# Patient Record
Sex: MALE | Race: BLACK OR AFRICAN AMERICAN | Employment: OTHER | URBAN - NONMETROPOLITAN AREA
[De-identification: names, ages, dates, MRNs, and addresses within clinical notes are randomized per-mention and may not be internally consistent; named-entity substitution may affect disease eponyms.]

---

## 2020-08-13 VITALS — HEIGHT: 69 IN | BODY MASS INDEX: 35.96 KG/M2

## 2020-08-13 DIAGNOSIS — E78.5 HYPERLIPIDEMIA, UNSPECIFIED HYPERLIPIDEMIA TYPE: ICD-10-CM

## 2020-08-13 DIAGNOSIS — J43.9 PULMONARY EMPHYSEMA, UNSPECIFIED EMPHYSEMA TYPE (HCC): ICD-10-CM

## 2020-08-13 PROBLEM — E11.9 TYPE 2 DIABETES MELLITUS WITHOUT COMPLICATIONS (HCC): Status: ACTIVE | Noted: 2019-09-19

## 2020-08-13 PROBLEM — I10 ESSENTIAL HYPERTENSION: Status: ACTIVE | Noted: 2019-09-19

## 2020-08-13 PROBLEM — D64.9 ANEMIA, UNSPECIFIED: Status: ACTIVE | Noted: 2020-03-31

## 2020-08-13 RX ORDER — GLIPIZIDE 2.5 MG/1
2.5 TABLET, EXTENDED RELEASE ORAL DAILY
COMMUNITY
End: 2021-02-15 | Stop reason: SDUPTHER

## 2020-08-13 RX ORDER — ALBUTEROL SULFATE 90 UG/1
2 AEROSOL, METERED RESPIRATORY (INHALATION)
COMMUNITY
End: 2021-02-15 | Stop reason: SDUPTHER

## 2020-08-13 RX ORDER — ATORVASTATIN CALCIUM 10 MG/1
10 TABLET, FILM COATED ORAL DAILY
COMMUNITY
End: 2021-02-15 | Stop reason: SDUPTHER

## 2020-08-13 RX ORDER — COLCHICINE 0.6 MG/1
0.6 TABLET ORAL DAILY
COMMUNITY
End: 2021-04-20 | Stop reason: SDUPTHER

## 2020-08-13 RX ORDER — HYDROCHLOROTHIAZIDE 25 MG/1
25 TABLET ORAL DAILY
COMMUNITY
End: 2021-02-15 | Stop reason: SDUPTHER

## 2020-08-13 RX ORDER — METFORMIN HYDROCHLORIDE 500 MG/1
TABLET, FILM COATED, EXTENDED RELEASE ORAL
COMMUNITY
End: 2021-02-15 | Stop reason: SDUPTHER

## 2020-08-13 RX ORDER — MONTELUKAST SODIUM 10 MG/1
10 TABLET ORAL DAILY
COMMUNITY
End: 2021-02-15 | Stop reason: SDUPTHER

## 2021-02-15 ENCOUNTER — VIRTUAL VISIT (OUTPATIENT)
Dept: FAMILY MEDICINE CLINIC | Age: 61
End: 2021-02-15
Payer: MEDICAID

## 2021-02-15 VITALS — DIASTOLIC BLOOD PRESSURE: 89 MMHG | SYSTOLIC BLOOD PRESSURE: 148 MMHG

## 2021-02-15 DIAGNOSIS — I10 ESSENTIAL HYPERTENSION: ICD-10-CM

## 2021-02-15 DIAGNOSIS — D50.8 IRON DEFICIENCY ANEMIA SECONDARY TO INADEQUATE DIETARY IRON INTAKE: ICD-10-CM

## 2021-02-15 DIAGNOSIS — I10 ESSENTIAL HYPERTENSION: Primary | ICD-10-CM

## 2021-02-15 DIAGNOSIS — E11.9 TYPE 2 DIABETES MELLITUS WITHOUT COMPLICATION, WITHOUT LONG-TERM CURRENT USE OF INSULIN (HCC): ICD-10-CM

## 2021-02-15 DIAGNOSIS — E78.41 ELEVATED LIPOPROTEIN(A): ICD-10-CM

## 2021-02-15 DIAGNOSIS — Z12.5 PROSTATE CANCER SCREENING: ICD-10-CM

## 2021-02-15 DIAGNOSIS — Z12.11 SCREENING FOR COLON CANCER: ICD-10-CM

## 2021-02-15 DIAGNOSIS — J43.9 PULMONARY EMPHYSEMA, UNSPECIFIED EMPHYSEMA TYPE (HCC): ICD-10-CM

## 2021-02-15 PROCEDURE — 99214 OFFICE O/P EST MOD 30 MIN: CPT | Performed by: NURSE PRACTITIONER

## 2021-02-15 RX ORDER — ALBUTEROL SULFATE 90 UG/1
2 AEROSOL, METERED RESPIRATORY (INHALATION)
Qty: 1 INHALER | Refills: 1 | Status: SHIPPED | OUTPATIENT
Start: 2021-02-15

## 2021-02-15 RX ORDER — GLIPIZIDE 2.5 MG/1
2.5 TABLET, EXTENDED RELEASE ORAL DAILY
Qty: 30 TAB | Refills: 1 | Status: SHIPPED | OUTPATIENT
Start: 2021-02-15 | End: 2021-04-20 | Stop reason: ALTCHOICE

## 2021-02-15 RX ORDER — METFORMIN HYDROCHLORIDE 500 MG/1
1 TABLET, FILM COATED, EXTENDED RELEASE ORAL DAILY
Qty: 30 TAB | Refills: 1 | Status: SHIPPED | OUTPATIENT
Start: 2021-02-15 | End: 2021-04-20 | Stop reason: ALTCHOICE

## 2021-02-15 RX ORDER — HYDROCHLOROTHIAZIDE 25 MG/1
25 TABLET ORAL DAILY
Qty: 30 TAB | Refills: 1 | Status: SHIPPED | OUTPATIENT
Start: 2021-02-15 | End: 2021-05-20

## 2021-02-15 RX ORDER — MONTELUKAST SODIUM 10 MG/1
10 TABLET ORAL DAILY
Qty: 30 TAB | Refills: 1 | Status: SHIPPED | OUTPATIENT
Start: 2021-02-15

## 2021-02-15 RX ORDER — ATORVASTATIN CALCIUM 10 MG/1
10 TABLET, FILM COATED ORAL DAILY
Qty: 30 TAB | Refills: 1 | Status: SHIPPED | OUTPATIENT
Start: 2021-02-15 | End: 2021-05-20

## 2021-02-15 NOTE — PROGRESS NOTES
Oleksandr Fitzpatrick is a 64 y.o. male who was seen by synchronous (real-time) audio-video technology on 2/15/2021 for Follow-up, Diabetes, Cholesterol Problem, and Hypertension        Assessment & Plan:   Diagnoses and all orders for this visit:    1. Essential hypertension  - Uncontrolled per his home readings   - Start medications as prescribed   - Checking labs   -Follow-up in 1 month, sooner if needed. Goal BP is 130/80 or lower   -     hydroCHLOROthiazide (HYDRODIURIL) 25 mg tablet; Take 1 Tab by mouth daily. Indications: high blood pressure  -     CBC WITH AUTOMATED DIFF; Future  -     METABOLIC PANEL, COMPREHENSIVE; Future  -     TSH 3RD GENERATION; Future    2. Type 2 diabetes mellitus without complication, without long-term current use of insulin (HCC)  -Unknown status   -Checking labs to include A1C   - I will contact him with results and any changes in medications   - Follow-up in 3 months   -     glipiZIDE SR (GLUCOTROL XL) 2.5 mg CR tablet; Take 1 Tab by mouth daily. Indications: type 2 diabetes mellitus  -     metFORMIN (GLUMETZA ER) 500 mg TG24 24 hour tablet; Take 500 mg by mouth daily. Indications: type 2 diabetes mellitus  -     HEMOGLOBIN A1C WITH EAG; Future  -     MICROALBUMIN, UR, RAND W/ MICROALB/CREAT RATIO; Future    3. Pulmonary emphysema, unspecified emphysema type (HCC)  -Controlled   -     albuterol (ProAir HFA) 90 mcg/actuation inhaler; Take 2 Puffs by inhalation every six (6) hours as needed for Wheezing. Indications: bronchospasm prevention  -     montelukast (SINGULAIR) 10 mg tablet; Take 1 Tab by mouth daily. Indications: controller medication for asthma    4. Elevated lipoprotein(a)  -Checking status   -Without statin for 4 months at least   -     atorvastatin (LIPITOR) 10 mg tablet; Take 1 Tab by mouth daily. Indications: high cholesterol and high triglycerides  -     LIPID PANEL; Future    5.  Iron deficiency anemia secondary to inadequate dietary iron intake  -Checking status  -     IRON PROFILE; Future  -     FERRITIN; Future    6. Screening for colon cancer  -     REFERRAL TO GASTROENTEROLOGY    7. Prostate cancer screening  -     PSA SCREENING (SCREENING)              712  Subjective:   Hypertension  Patient is here for follow-up of hypertension. He indicates that he is feeling well and denies any symptoms referable to his hypertension. He is exercising and is adherent to low salt diet. Blood pressure is well controlled at home. Patient reports being without medications for about 4 months. Hyperlipidemia   Diet and Lifestyle: generally follows a low fat low cholesterol diet, generally follows a low sodium diet  Taking medications as instructed, no medication side effects noted, no TIA's, no chest pain on exertion, no dyspnea on exertion, no swelling of ankles. Diabetes   Myrna Clark is a 64 y.o. male who presents for follow-up of Type 2 diabetes mellitus. Patient has been without medication for 4 months. He is not monitoring his blood sugar. Last seen by eye doctor 6 months. Denies any polyuria, polydipsia, visual disturbances, paresthesias of the feet or dyspnea on exertion      Prior to Admission medications    Medication Sig Start Date End Date Taking? Authorizing Provider   albuterol (ProAir HFA) 90 mcg/actuation inhaler Take 2 Puffs by inhalation every six (6) hours as needed for Wheezing. Provider, Historical   montelukast (SINGULAIR) 10 mg tablet Take 10 mg by mouth daily. Provider, Historical   metFORMIN (GLUMETZA ER) 500 mg TG24 24 hour tablet Take  by mouth. Provider, Historical   hydroCHLOROthiazide (HYDRODIURIL) 25 mg tablet Take 25 mg by mouth daily. Provider, Historical   glipiZIDE SR (GLUCOTROL XL) 2.5 mg CR tablet Take 2.5 mg by mouth daily. Provider, Historical   colchicine 0.6 mg tablet Take 0.6 mg by mouth daily. Provider, Historical   atorvastatin (LIPITOR) 10 mg tablet Take 10 mg by mouth daily.     Provider, Historical         Review of Systems   Constitutional: Negative for chills, fever, malaise/fatigue and weight loss. HENT: Negative for sinus pain and sore throat. Eyes: Negative for blurred vision. Respiratory: Negative for cough, sputum production, shortness of breath and wheezing. Cardiovascular: Negative for chest pain, palpitations and leg swelling. Gastrointestinal: Negative for abdominal pain, constipation, diarrhea and heartburn. Genitourinary: Negative for dysuria and frequency. Musculoskeletal: Negative for falls and myalgias. Skin: Negative for rash. Neurological: Negative for dizziness, tingling, weakness and headaches. Endo/Heme/Allergies: Does not bruise/bleed easily. Psychiatric/Behavioral: Negative for depression. The patient is not nervous/anxious and does not have insomnia. Physical Exam  Vitals signs and nursing note reviewed. Constitutional:       Appearance: Normal appearance. Comments: Physical exam was deferred due to COVID- 19 precautions as visit was completed via telemedicine. Neurological:      Mental Status: He is alert. Dee Dukes, who was evaluated through a patient-initiated, synchronous (real-time) audio-video encounter, and/or his healthcare decision maker, is aware that it is a billable service, with coverage as determined by his insurance carrier. He provided verbal consent to proceed: Yes, and patient identification was verified. It was conducted pursuant to the emergency declaration under the [facility], [address] [place] [address] authority and the [employer] Act. A caregiver was present when appropriate. Ability to conduct physical exam was limited. I was in the office. The patient was at home.       Mp Turner NP

## 2021-02-15 NOTE — PROGRESS NOTES
patient stated he hasnt been on any meds in a while because we did not fill them but when I updated the medication list most of them said filled in 2014    He has to do a tele visit

## 2021-02-16 ENCOUNTER — HOSPITAL ENCOUNTER (OUTPATIENT)
Dept: LAB | Age: 61
Discharge: HOME | End: 2021-02-16

## 2021-02-17 LAB — HBA1C MFR BLD HPLC: 5.8 %

## 2021-03-02 DIAGNOSIS — R97.20 ELEVATED PROSTATE SPECIFIC ANTIGEN (PSA): Primary | ICD-10-CM

## 2021-03-08 ENCOUNTER — VIRTUAL VISIT (OUTPATIENT)
Dept: FAMILY MEDICINE CLINIC | Age: 61
End: 2021-03-08
Payer: MEDICAID

## 2021-03-08 DIAGNOSIS — I10 ESSENTIAL HYPERTENSION: Primary | ICD-10-CM

## 2021-03-08 DIAGNOSIS — R97.20 ELEVATED PSA: ICD-10-CM

## 2021-03-08 PROCEDURE — 99442 PR PHYS/QHP TELEPHONE EVALUATION 11-20 MIN: CPT | Performed by: NURSE PRACTITIONER

## 2021-03-08 RX ORDER — COLCHICINE 0.6 MG/1
0.6 TABLET ORAL DAILY
Status: CANCELLED | OUTPATIENT
Start: 2021-03-08

## 2021-03-08 NOTE — PROGRESS NOTES
Alcides Robin is a 64 y.o. male, evaluated via audio-only technology on 3/8/2021 for Follow-up (3 week follow up)  . Assessment & Plan:   Diagnoses and all orders for this visit:    1. Essential hypertension  -Under better control since starting medication as prescribed. Continue to monitor.   -Follow-up in 4 months, sooner if needed   2. Elevated PSA  -PSA 21.9   -Stat urology referral placed. 12  Subjective:   Hypertension  Patient is here for follow-up of hypertension. He indicates that he is feeling well and denies any symptoms referable to his hypertension. He is  adherent to low salt diet. Blood pressure is well controlled at home. Reading today before medication 138/70, controlled since his last visit since restarting medication, he was without medication for about 4 months. Elevated PSA -Recent PSA is 21, Urology referral sent stat to Dr. Kathryn Bhagat office. Prior to Admission medications    Medication Sig Start Date End Date Taking? Authorizing Provider   albuterol (ProAir HFA) 90 mcg/actuation inhaler Take 2 Puffs by inhalation every six (6) hours as needed for Wheezing. Indications: bronchospasm prevention 2/15/21  Yes Elder Mitchell NP   atorvastatin (LIPITOR) 10 mg tablet Take 1 Tab by mouth daily. Indications: high cholesterol and high triglycerides 2/15/21  Yes Cris Pozo NP   glipiZIDE SR (GLUCOTROL XL) 2.5 mg CR tablet Take 1 Tab by mouth daily. Indications: type 2 diabetes mellitus 2/15/21  Yes Petty Pozo NP   hydroCHLOROthiazide (HYDRODIURIL) 25 mg tablet Take 1 Tab by mouth daily. Indications: high blood pressure 2/15/21  Yes Petty Pozo NP   metFORMIN (GLUMETZA ER) 500 mg TG24 24 hour tablet Take 500 mg by mouth daily. Indications: type 2 diabetes mellitus 2/15/21  Yes Petty Pozo NP   montelukast (SINGULAIR) 10 mg tablet Take 1 Tab by mouth daily.  Indications: controller medication for asthma 2/15/21  Yes Elder Mitchell NP colchicine 0.6 mg tablet Take 0.6 mg by mouth daily. Yes Provider, Historical         Review of Systems   Constitutional: Negative for chills, fever, malaise/fatigue and weight loss. HENT: Negative for sinus pain and sore throat. Eyes: Negative for blurred vision. Respiratory: Negative for cough, sputum production, shortness of breath and wheezing. Cardiovascular: Negative for chest pain, palpitations and leg swelling. Gastrointestinal: Negative for abdominal pain, constipation, diarrhea and heartburn. Genitourinary: Positive for frequency and urgency. Negative for dysuria. Musculoskeletal: Negative for falls and myalgias. Skin: Negative for rash. Neurological: Negative for dizziness, tingling, weakness and headaches. Endo/Heme/Allergies: Does not bruise/bleed easily. Psychiatric/Behavioral: Negative for depression. The patient is not nervous/anxious and does not have insomnia. Physical Exam  Vitals signs and nursing note reviewed. Constitutional:       Comments: Physical exam was deferred due to COVID- 19 precautions as visit was completed via telemedicine. Patient-Reported Vitals 3/8/2021   Patient-Reported Weight [facility], who was evaluated through a patient-initiated, synchronous (real-time) audio only encounter, and/or her healthcare decision maker, is aware that it is a billable service, with coverage as determined by his insurance carrier. He provided verbal consent to proceed: Yes. He has not had a related appointment within my department in the past 7 days or scheduled within the next 24 hours.       Total Time: minutes: 11-20 minutes    Armida Flores NP

## 2021-03-15 VITALS — DIASTOLIC BLOOD PRESSURE: 70 MMHG | SYSTOLIC BLOOD PRESSURE: 138 MMHG

## 2021-04-20 ENCOUNTER — VIRTUAL VISIT (OUTPATIENT)
Dept: FAMILY MEDICINE CLINIC | Age: 61
End: 2021-04-20
Payer: MEDICAID

## 2021-04-20 DIAGNOSIS — M1A.09X0 IDIOPATHIC CHRONIC GOUT OF MULTIPLE SITES WITHOUT TOPHUS: ICD-10-CM

## 2021-04-20 DIAGNOSIS — E11.9 TYPE 2 DIABETES MELLITUS WITHOUT COMPLICATION, WITHOUT LONG-TERM CURRENT USE OF INSULIN (HCC): Primary | ICD-10-CM

## 2021-04-20 PROCEDURE — 99442 PR PHYS/QHP TELEPHONE EVALUATION 11-20 MIN: CPT | Performed by: INTERNAL MEDICINE

## 2021-04-20 RX ORDER — COLCHICINE 0.6 MG/1
0.6 TABLET ORAL DAILY
Qty: 60 TAB | Refills: 3 | Status: SHIPPED | OUTPATIENT
Start: 2021-04-20 | End: 2021-06-03 | Stop reason: SDUPTHER

## 2021-04-20 NOTE — PROGRESS NOTES
Renato Qureshi is a 63 y.o. male, evaluated via audio-only technology on 4/20/2021 for No chief complaint on file. Doing well. Good BP. Sugars are well. Noted a1c. Needs refills on gout meds. Currently is not having any pain. Is following up with his urologist for his elevated PSA. He got good bowels and bladder at this point. Assessment & Plan:   1. Type 2 diabetes mellitus without complication, without long-term current use of insulin ([address]  At this time we will stop his glipizide as well as his Metformin. The patient is currently walking daily and we continue to recommend that he walk and exercise daily. Additionally went over his diet to continue to maintain he limits overall sweets and overall sugars but continue on good fruits and vegetables. We will need to follow this up in 3 months to see what his labs go. 2. Idiopathic chronic gout of multiple sites without tophus  Patient is been stable on his colchicine now and used it rarely. Continue to use it as needed. Continue to watch diet as well as increase fluid intake. 12  Subjective:       Prior to Admission medications    Medication Sig Start Date End Date Taking? Authorizing Provider   albuterol (ProAir HFA) 90 mcg/actuation inhaler Take 2 Puffs by inhalation every six (6) hours as needed for Wheezing. Indications: bronchospasm prevention 2/15/21   Sarah Orourke NP   atorvastatin (LIPITOR) 10 mg tablet Take 1 Tab by mouth daily. Indications: high cholesterol and high triglycerides 2/15/21   Sarah Orourke NP   hydroCHLOROthiazide (HYDRODIURIL) 25 mg tablet Take 1 Tab by mouth daily. Indications: high blood pressure 2/15/21   Peace Pozo NP   montelukast (SINGULAIR) 10 mg tablet Take 1 Tab by mouth daily. Indications: controller medication for asthma 2/15/21   Sarah Orourke NP   glipiZIDE SR (GLUCOTROL XL) 2.5 mg CR tablet Take 1 Tab by mouth daily.  Indications: type 2 diabetes mellitus 2/15/21 4/20/21  Stacia Chase Lis Owens NP   metFORMIN (GLUMETZA ER) 500 mg TG24 24 hour tablet Take 500 mg by mouth daily. Indications: type 2 diabetes mellitus 2/15/21 4/20/21  Tracy Munguia NP   colchicine 0.6 mg tablet Take 0.6 mg by mouth daily. Provider, Historical         ROS    Patient-Reported Vitals 3/8/2021   Patient-Reported Weight [facility], who was evaluated through a patient-initiated, synchronous (real-time) audio only encounter, and/or her healthcare decision maker, is aware that it is a billable service, with coverage as determined by his insurance carrier. He provided verbal consent to proceed: Yes. He has not had a related appointment within my department in the past 7 days or scheduled within the next 24 hours.       Total Time: minutes: 11-20 minutes    Rasheed Carbone MD

## 2021-05-04 DIAGNOSIS — Z12.11 ENCOUNTER FOR SCREENING COLONOSCOPY: Primary | ICD-10-CM

## 2021-05-04 RX ORDER — POLYETHYLENE GLYCOL 3350 17 G/17G
POWDER, FOR SOLUTION ORAL
Qty: 510 G | Refills: 0 | Status: SHIPPED | OUTPATIENT
Start: 2021-05-04 | End: 2021-05-28

## 2021-05-04 NOTE — TELEPHONE ENCOUNTER
Mom, Samantha Bañuelos called in said her son Jesi Benson, who is autistic needs to be scheduled for a colon screening. We discussed the questionaire, he is ok to schedule. She chose May 28th at 11:00. She will help him to do prep and bring him for covid test and colonoscopy. Orders and instructions mailed to them, Miralax prep pended to Dr Kathy Liu to send to [facility]. Orders routed to Rory Best RN, and faxed to [phone] scheduling . Notification sent to Lilliam Elmore NP.  No PA required per [place] B at [facility] at 11:47 am.

## 2021-05-04 NOTE — LETTER
2021 11:45 AM 
 
Mr. Alana Galicia [address] [facility] [address] Dear Wilfredo Jewell NP Thank you for referring your patient Alana Galicia , : 1960, to us for colonoscopy screening. In contacting the patient, he has been scheduled for 2021. Sincerely, Codi Castle MD

## 2021-05-18 DIAGNOSIS — E78.41 ELEVATED LIPOPROTEIN(A): ICD-10-CM

## 2021-05-18 DIAGNOSIS — I10 ESSENTIAL HYPERTENSION: ICD-10-CM

## 2021-05-20 RX ORDER — ATORVASTATIN CALCIUM 10 MG/1
TABLET, FILM COATED ORAL
Qty: 30 TABLET | Refills: 0 | Status: SHIPPED | OUTPATIENT
Start: 2021-05-20 | End: 2021-07-27

## 2021-05-20 RX ORDER — HYDROCHLOROTHIAZIDE 25 MG/1
TABLET ORAL
Qty: 30 TABLET | Refills: 0 | Status: SHIPPED | OUTPATIENT
Start: 2021-05-20 | End: 2021-07-27

## 2021-05-24 ENCOUNTER — HOSPITAL ENCOUNTER (OUTPATIENT)
Dept: PREADMISSION TESTING | Age: 61
Discharge: HOME | End: 2021-05-24
Payer: MEDICAID

## 2021-05-24 LAB — SARS-COV-2, COV2: NORMAL

## 2021-05-24 PROCEDURE — U0003 INFECTIOUS AGENT DETECTION BY NUCLEIC ACID (DNA OR RNA); SEVERE ACUTE RESPIRATORY SYNDROME CORONAVIRUS 2 (SARS-COV-2) (CORONAVIRUS DISEASE [COVID-19]), AMPLIFIED PROBE TECHNIQUE, MAKING USE OF HIGH THROUGHPUT TECHNOLOGIES AS DESCRIBED BY CMS-2020-01-R: HCPCS

## 2021-05-25 LAB — SARS-COV-2, COV2NT: NOT DETECTED

## 2021-05-28 ENCOUNTER — ANESTHESIA EVENT (OUTPATIENT)
Dept: ENDOSCOPY | Age: 61
End: 2021-05-28
Payer: MEDICAID

## 2021-05-28 ENCOUNTER — ANESTHESIA (OUTPATIENT)
Dept: ENDOSCOPY | Age: 61
End: 2021-05-28
Payer: MEDICAID

## 2021-05-28 ENCOUNTER — HOSPITAL ENCOUNTER (OUTPATIENT)
Age: 61
Setting detail: OUTPATIENT SURGERY
Discharge: HOME | End: 2021-05-28
Attending: INTERNAL MEDICINE | Admitting: INTERNAL MEDICINE
Payer: MEDICAID

## 2021-05-28 VITALS
SYSTOLIC BLOOD PRESSURE: 165 MMHG | DIASTOLIC BLOOD PRESSURE: 90 MMHG | TEMPERATURE: 98 F | WEIGHT: 213 LBS | BODY MASS INDEX: 31.55 KG/M2 | HEIGHT: 69 IN | OXYGEN SATURATION: 100 % | HEART RATE: 57 BPM | RESPIRATION RATE: 20 BRPM

## 2021-05-28 PROCEDURE — 76060000031 HC ANESTHESIA FIRST 0.5 HR: Performed by: INTERNAL MEDICINE

## 2021-05-28 PROCEDURE — 74011250636 HC RX REV CODE- 250/636: Performed by: INTERNAL MEDICINE

## 2021-05-28 PROCEDURE — 2709999900 HC NON-CHARGEABLE SUPPLY: Performed by: INTERNAL MEDICINE

## 2021-05-28 PROCEDURE — 74011250636 HC RX REV CODE- 250/636: Performed by: NURSE ANESTHETIST, CERTIFIED REGISTERED

## 2021-05-28 PROCEDURE — 45385 COLONOSCOPY W/LESION REMOVAL: CPT | Performed by: INTERNAL MEDICINE

## 2021-05-28 PROCEDURE — 76040000019: Performed by: INTERNAL MEDICINE

## 2021-05-28 PROCEDURE — 77030013992 HC SNR POLYP ENDOSC BSC -B: Performed by: INTERNAL MEDICINE

## 2021-05-28 PROCEDURE — 88305 TISSUE EXAM BY PATHOLOGIST: CPT

## 2021-05-28 PROCEDURE — 77030018831 HC SOL IRR H20 BAXT -A: Performed by: INTERNAL MEDICINE

## 2021-05-28 RX ORDER — SODIUM CHLORIDE 9 MG/ML
25 INJECTION, SOLUTION INTRAVENOUS CONTINUOUS
Status: DISCONTINUED | OUTPATIENT
Start: 2021-05-28 | End: 2021-05-28 | Stop reason: HOSPADM

## 2021-05-28 RX ORDER — SODIUM CHLORIDE 0.9 % (FLUSH) 0.9 %
5-40 SYRINGE (ML) INJECTION EVERY 8 HOURS
Status: CANCELLED | OUTPATIENT
Start: 2021-05-28

## 2021-05-28 RX ORDER — PROPOFOL 10 MG/ML
INJECTION, EMULSION INTRAVENOUS AS NEEDED
Status: DISCONTINUED | OUTPATIENT
Start: 2021-05-28 | End: 2021-05-28 | Stop reason: HOSPADM

## 2021-05-28 RX ORDER — SODIUM CHLORIDE 0.9 % (FLUSH) 0.9 %
5-40 SYRINGE (ML) INJECTION AS NEEDED
Status: CANCELLED | OUTPATIENT
Start: 2021-05-28

## 2021-05-28 RX ORDER — SODIUM CHLORIDE 9 MG/ML
INJECTION, SOLUTION INTRAVENOUS
Status: DISCONTINUED | OUTPATIENT
Start: 2021-05-28 | End: 2021-05-28 | Stop reason: HOSPADM

## 2021-05-28 RX ORDER — SODIUM CHLORIDE 9 MG/ML
125 INJECTION, SOLUTION INTRAVENOUS CONTINUOUS
Status: CANCELLED | OUTPATIENT
Start: 2021-05-28

## 2021-05-28 RX ADMIN — PROPOFOL 100 MG: 10 INJECTION, EMULSION INTRAVENOUS at 11:10

## 2021-05-28 RX ADMIN — PROPOFOL 100 MG: 10 INJECTION, EMULSION INTRAVENOUS at 11:09

## 2021-05-28 RX ADMIN — SODIUM CHLORIDE 25 ML/HR: 9 INJECTION, SOLUTION INTRAVENOUS at 10:45

## 2021-05-28 RX ADMIN — PROPOFOL 100 MG: 10 INJECTION, EMULSION INTRAVENOUS at 11:12

## 2021-05-28 RX ADMIN — SODIUM CHLORIDE: 9 INJECTION, SOLUTION INTRAVENOUS at 10:55

## 2021-05-28 RX ADMIN — PROPOFOL 100 MG: 10 INJECTION, EMULSION INTRAVENOUS at 11:07

## 2021-05-28 NOTE — OP NOTES
Colonoscopy Procedure Note      Patient: Katy ZavaletaSulphur MRN: [id number]  SSN: [id number]    YOB: 1960  Age: 63 y.o. Sex: male      Date of Procedure: 5/28/2021  Date/Time:  5/28/2021 11:24 AM       IMPRESSION:     1. Rectal polyp   2. Generalized diverticulosis         RECOMMENDATIONS:     1) Check biopsy results. 2) Await pathology report. Call me in 2 weeks if you have not received any information from my office regarding your results. 3) Repeat colonoscopy in 2 to 3 years or as determined by the pathology report. INDICATION: Colon screening     PROCEDURE PERFORMED: Colonoscopy with hot snare polypectomy     DESCRIPTION OF PROCEDURE: An informed consent was obtained. The patient was placed in left lateral position. Perianal inspection and a digital rectal exam was performed. Video colonoscope was introduced into the rectum and advanced under direct vision up to the terminal ileum. With adequate insufflation and maneuvering of the withdrawing scope, the colonic mucosa was visualized carefully. Retroflexion was performed in the rectum to see the anorectum and also in the ascending colon to look behind the folds. Vital signs, pulse oximetry, single lead cardiac monitor were monitored throughout the procedure as the sedation was titrated to the desired effect ensuring patient comfort and safety. The patient tolerated the procedure very well and was transferred to the recovery area. Following is the summary of findings: A 1.2 cm pedunculated polyp with a short stalk was noted in the proximal rectum. It was removed via hot snare polypectomy. A few scattered diverticula were noted throughout the colon with clusters in the sigmoid colon and in the ascending colon.      ENDOSCOPIST: Gerry Escobedo MD      ENDOSCOPE: Olympus video colonoscope     ASSISTANT:Circ-1: Wilder Fisher              Scrub Tech-1: Charisse Peralta     ANESTHESIA: TIVA      QUALITY OF PREPARATION: Good      FINDINGS:   1. Rectal polyp  2.  Generalized diverticulosis      EBL: None   SPECIMENS:   ID Type Source Tests Collected by Time Destination   1 : Rectal polyp Preservative Rectal  Yoon Guevara MD 5/28/2021 1117 Pathology             Indio Moreno MD  May 28, 2021  11:24 AM

## 2021-05-28 NOTE — H&P
History and Physical    Anell [facility]        1960  [id number]        [id number]     Pre-Procedure Diagnosis:  SCREENING    Chief Complaint:  No chief complaint on file. HPI: 61-year-old male with history of hypertension, hyperlipidemia, and gout who comes in for screening colonoscopy. Past Medical History:   Diagnosis Date    Elevated PSA     Gout     Gout     High cholesterol     Hyperlipemia     Hypertension     Pulmonary disease      Past Surgical History:   Procedure Laterality Date    HX OTHER SURGICAL      cyst removed-foot     Family History   Problem Relation Age of Onset    Hypertension Sister     Cancer Maternal Grandmother         colon    Cancer Maternal Grandfather         bone    Asthma Mother      Social History     Socioeconomic History    Marital status: SINGLE     Spouse name: Not on file    Number of children: Not on file    Years of education: Not on file    Highest education level: Not on file   Tobacco Use    Smoking status: Former Smoker     Types: Cigarettes    Smokeless tobacco: Never Used   Substance and Sexual Activity    Alcohol use: Yes     Alcohol/week: 5.0 standard drinks     Types: 1 Glasses of wine, 2 Cans of beer, 2 Shots of liquor per week     Comment: sometimes every day    Drug use: No   Other Topics Concern     Social Determinants of Health     Financial Resource Strain:     Difficulty of Paying Living Expenses:    Food Insecurity:     Worried About Running Out of Food in the Last Year:     Ran Out of Food in the Last Year:    Transportation Needs:     Lack of Transportation (Medical):      Lack of Transportation (Non-Medical):    Physical Activity:     Days of Exercise per Week:     Minutes of Exercise per Session:    Stress:     Feeling of Stress :    Social Connections:     Frequency of Communication with Friends and Family:     Frequency of Social Gatherings with Friends and Family:     Attends [employer] Services:     Active Member of Clubs or Organizations:     Attends Club or Organization Meetings:     Marital Status:        Allergies: Allergies   Allergen Reactions    Stings [Sting, Bee] Swelling     Medications:   Current Facility-Administered Medications   Medication Dose Route Frequency    0.9% sodium chloride infusion  25 mL/hr IntraVENous CONTINUOUS     Vital Signs   Visit Vitals  BP (!) 159/91   Pulse 73   Temp 98.8 °F (37.1 °C)   Resp 18   Ht 5' 9\" (1.753 m)   Wt 96.6 kg (213 lb)   SpO2 100%   BMI 31.45 kg/m²       Review of Systems  Review of systems as noted in HPI    Physical Exam:  General:  Alert, cooperative, no distress, appears stated age. Eyes:  No icterus   Neck: Supple, no adenopathy and no JVD. Lungs:   Clear to auscultation bilaterally. Heart:  Regular rate and rhythm, S1, S2 normal, no murmur, click, rub or gallop. Abdomen:   Soft, non-tender. Bowel sounds normal. No masses,  No organomegaly. Neurologic: Grossly intact. Laboratory Data:  No results found for this or any previous visit (from the past 24 hour(s)).     Hospital Problems  Date Reviewed: 4/20/2021    None          Impression and Plan:  Colon screening  -Colonoscopy       Luz Maria Ricardo MD  5/28/2021  10:59 AM

## 2021-05-28 NOTE — ANESTHESIA POSTPROCEDURE EVALUATION
Procedure(s):  COLONOSCOPY (TIVA). total IV anesthesia    Anesthesia Post Evaluation      Multimodal analgesia: multimodal analgesia not used between 6 hours prior to anesthesia start to PACU discharge  Patient location during evaluation: PACU  Patient participation: complete - patient participated  Pain score: 0  Pain management: adequate  Airway patency: patent  Anesthetic complications: no  Cardiovascular status: acceptable  Respiratory status: acceptable and room air  Hydration status: acceptable  Post anesthesia nausea and vomiting:  none  Final Post Anesthesia Temperature Assessment:  Normothermia (36.0-37.5 degrees C)      INITIAL Post-op Vital signs: No vitals data found for the desired time range.

## 2021-05-28 NOTE — H&P
Patient: Felix Reilly MRN: [id number]  SSN: [id number]    YOB: 1960  Age: 63 y.o. Sex: male      History and Physical    Felix Reilly is a 63 y.o. male having Procedure(s):  COLONOSCOPY (TIVA). Allergies: Allergies   Allergen Reactions    Stings [Sting, Bee] Swelling        Chief Complaint:screening    History of Present Illness:     Past Medical History:   Diagnosis Date    Elevated PSA     Gout     Gout     High cholesterol     Hyperlipemia     Hypertension     Pulmonary disease       Past Surgical History:   Procedure Laterality Date    HX OTHER SURGICAL      cyst removed-foot      Family History   Problem Relation Age of Onset    Hypertension Sister     Cancer Maternal Grandmother         colon    Cancer Maternal Grandfather         bone    Asthma Mother       Social History     Tobacco Use    Smoking status: Former Smoker     Types: Cigarettes    Smokeless tobacco: Never Used   Substance Use Topics    Alcohol use: Yes     Alcohol/week: 5.0 standard drinks     Types: 1 Glasses of wine, 2 Cans of beer, 2 Shots of liquor per week     Comment: sometimes every day        Prior to Admission medications    Medication Sig Start Date End Date Taking? Authorizing Provider   atorvastatin (LIPITOR) 10 mg tablet TAKE 1 TABLET BY MOUTH ONCE DAILY FOR  HIGH  CHOLESTEROL  AND  HIGH  TRIGLYCERIDES 5/20/21  Yes Jeannine Yadav MD   hydroCHLOROthiazide (HYDRODIURIL) 25 mg tablet TAKE 1 TABLET BY MOUTH ONCE DAILY FOR HIGH BLOOD PRESSURE 5/20/21  Yes Jeannine Yadav MD   polyethylene glycol [facility]) 17 gram/dose powder Use as instructed by physician for colonoscopy prep. Indications: emptying of the bowel 5/4/21  Yes Justin Prajapati MD   colchicine 0.6 mg tablet Take 1 Tab by mouth daily. 4/20/21  Yes Jeannine Yadav MD   albuterol (ProAir HFA) 90 mcg/actuation inhaler Take 2 Puffs by inhalation every six (6) hours as needed for Wheezing.  Indications: bronchospasm prevention 2/15/21  Yes Mare Pozo NP   montelukast (SINGULAIR) 10 mg tablet Take 1 Tab by mouth daily.  Indications: controller medication for asthma 2/15/21  Yes Perez Mayes NP        Visit Vitals  BP (!) 159/91   Pulse 73   Temp 37.1 °C (98.8 °F)   Resp 18   Ht 5' 9\" (1.753 m)   Wt 96.6 kg (213 lb)   SpO2 100%   BMI 31.45 kg/m²        Assessment and Plan:   Lo Naqvi is a 63 y.o. male having Procedure(s):  COLONOSCOPY (TIVA)     Preanesthesia Evaluation     Last edited 05/28/21 1050 by Sushil Cotton CRNA  Date of Service 05/28/21 1050  Status: Signed             Relevant Problems   No relevant active problems       Anesthetic History   No history of anesthetic complications  Other anesthesia complications          Review of Systems / Medical History  Patient summary reviewed, nursing notes reviewed and pertinent labs reviewed    Pulmonary  Within defined limits                 Neuro/Psych   Within defined limits           Cardiovascular  Within defined limits                     GI/Hepatic/Renal  Within defined limits              Endo/Other  Within defined limits           Other Findings              Physical Exam    Airway  Mallampati: II  TM Distance: 4 - 6 cm  Neck ROM: normal range of motion        Cardiovascular    Rhythm: regular  Rate: normal         Dental  No notable dental hx       Pulmonary  Breath sounds clear to auscultation               Abdominal  Abdominal exam normal       Other Findings            Anesthetic Plan    ASA: 3  Anesthesia type: total IV anesthesia            Anesthetic plan and risks discussed with: Patient               Preanesthesia evaluation performed by Sushil Cotton CRNA            Signed By: Dane Romero CRNA     May 28, 2021

## 2021-05-28 NOTE — ANESTHESIA PREPROCEDURE EVALUATION
Relevant Problems   No relevant active problems       Anesthetic History   No history of anesthetic complications  Other anesthesia complications          Review of Systems / Medical History  Patient summary reviewed, nursing notes reviewed and pertinent labs reviewed    Pulmonary  Within defined limits                 Neuro/Psych   Within defined limits           Cardiovascular  Within defined limits                     GI/Hepatic/Renal  Within defined limits              Endo/Other  Within defined limits           Other Findings              Physical Exam    Airway  Mallampati: II  TM Distance: 4 - 6 cm  Neck ROM: normal range of motion        Cardiovascular    Rhythm: regular  Rate: normal         Dental  No notable dental hx       Pulmonary  Breath sounds clear to auscultation               Abdominal  Abdominal exam normal       Other Findings            Anesthetic Plan    ASA: 3  Anesthesia type: total IV anesthesia            Anesthetic plan and risks discussed with: Patient

## 2021-05-28 NOTE — INTERVAL H&P NOTE
Update History & Physical 
 
The Patient's History and Physical of May 28, 2021 was reviewed with the patient and I examined the patient. There was no change. The surgical site was confirmed by the patient and me. Plan:  The risk, benefits, expected outcome, and alternative to the recommended procedure have been discussed with the patient. Patient understands and wants to proceed with the procedure.  
 
Electronically signed by Navneet Rubio MD on 5/28/2021 at 11:01 AM

## 2021-06-03 RX ORDER — COLCHICINE 0.6 MG/1
0.6 TABLET ORAL DAILY
Qty: 60 TABLET | Refills: 3 | Status: SHIPPED | OUTPATIENT
Start: 2021-06-03 | End: 2022-05-17 | Stop reason: SDUPTHER

## 2021-06-03 NOTE — TELEPHONE ENCOUNTER
Requested Prescriptions     Pending Prescriptions Disp Refills    colchicine 0.6 mg tablet 60 Tablet 3     Sig: Take 1 Tablet by mouth daily.

## 2021-07-27 DIAGNOSIS — E78.41 ELEVATED LIPOPROTEIN(A): ICD-10-CM

## 2021-07-27 DIAGNOSIS — I10 ESSENTIAL HYPERTENSION: ICD-10-CM

## 2021-07-27 RX ORDER — HYDROCHLOROTHIAZIDE 25 MG/1
TABLET ORAL
Qty: 30 TABLET | Refills: 0 | Status: SHIPPED | OUTPATIENT
Start: 2021-07-27 | End: 2022-08-03 | Stop reason: ALTCHOICE

## 2021-07-27 RX ORDER — ATORVASTATIN CALCIUM 10 MG/1
TABLET, FILM COATED ORAL
Qty: 30 TABLET | Refills: 0 | Status: SHIPPED | OUTPATIENT
Start: 2021-07-27 | End: 2021-11-29

## 2021-08-05 ENCOUNTER — TELEPHONE (OUTPATIENT)
Dept: FAMILY MEDICINE CLINIC | Age: 61
End: 2021-08-05

## 2021-08-05 NOTE — TELEPHONE ENCOUNTER
Patient requested a call back from a nurse in regards to leg pain and medication question.  [phone]

## 2021-11-23 DIAGNOSIS — E78.41 ELEVATED LIPOPROTEIN(A): ICD-10-CM

## 2021-11-29 RX ORDER — ATORVASTATIN CALCIUM 10 MG/1
TABLET, FILM COATED ORAL
Qty: 30 TABLET | Refills: 0 | Status: SHIPPED | OUTPATIENT
Start: 2021-11-29

## 2022-03-18 PROBLEM — I10 ESSENTIAL HYPERTENSION: Status: ACTIVE | Noted: 2019-09-19

## 2022-03-19 PROBLEM — E78.5 HYPERLIPIDEMIA, UNSPECIFIED: Status: ACTIVE | Noted: 2019-09-19

## 2022-03-19 PROBLEM — D64.9 ANEMIA, UNSPECIFIED: Status: ACTIVE | Noted: 2020-03-31

## 2022-03-19 PROBLEM — E11.9 TYPE 2 DIABETES MELLITUS WITHOUT COMPLICATIONS (HCC): Status: ACTIVE | Noted: 2019-09-19

## 2022-03-20 PROBLEM — J43.9 EMPHYSEMA, UNSPECIFIED (HCC): Status: ACTIVE | Noted: 2020-03-31

## 2022-05-17 RX ORDER — COLCHICINE 0.6 MG/1
0.6 TABLET ORAL DAILY
Qty: 60 TABLET | Refills: 0 | Status: SHIPPED | OUTPATIENT
Start: 2022-05-17

## 2022-05-17 NOTE — TELEPHONE ENCOUNTER
----- Message from Alexandria Harper sent at 9/70/8634 10:20 AM EDT -----  Subject: Refill Request    QUESTIONS  Name of Medication? colchicine 0.6 mg tablet  Patient-reported dosage and instructions? 1x daily  How many days do you have left? 0  [facility] phone number (if available)? [phone]  ---------------------------------------------------------------------------  --------------  CALL BACK INFO  What is the best way for the office to contact you? OK to leave message on   voicemail  Preferred Call Back Phone Number? [phone]  ---------------------------------------------------------------------------  --------------  SCRIPT ANSWERS  Relationship to Patient? Guardian  Representative Name? Darren  Is the Representative on the appropriate HIPAA document in Epic?  Yes

## 2022-05-17 NOTE — TELEPHONE ENCOUNTER
Last Visit: 4/20/21 with MD Nayeli Coombs  Next Appointment: 7/1/22 with TRAVIS Hameed  Previous Refill Encounter(s): 6/3/21 #60 with 3 refills    Requested Prescriptions     Pending Prescriptions Disp Refills    colchicine 0.6 mg tablet 60 Tablet 0     Sig: Take 1 Tablet by mouth daily.

## 2022-08-03 ENCOUNTER — OFFICE VISIT (OUTPATIENT)
Dept: FAMILY MEDICINE CLINIC | Age: 62
End: 2022-08-03
Payer: MEDICAID

## 2022-08-03 VITALS
SYSTOLIC BLOOD PRESSURE: 152 MMHG | HEART RATE: 70 BPM | TEMPERATURE: 97.7 F | WEIGHT: 242 LBS | OXYGEN SATURATION: 98 % | DIASTOLIC BLOOD PRESSURE: 80 MMHG | RESPIRATION RATE: 20 BRPM | HEIGHT: 69 IN | BODY MASS INDEX: 35.84 KG/M2

## 2022-08-03 DIAGNOSIS — R68.89 OTHER GENERAL SYMPTOMS AND SIGNS: ICD-10-CM

## 2022-08-03 DIAGNOSIS — D50.8 IRON DEFICIENCY ANEMIA SECONDARY TO INADEQUATE DIETARY IRON INTAKE: ICD-10-CM

## 2022-08-03 DIAGNOSIS — R97.20 ELEVATED PSA: ICD-10-CM

## 2022-08-03 DIAGNOSIS — E78.2 MIXED HYPERLIPIDEMIA: ICD-10-CM

## 2022-08-03 DIAGNOSIS — I10 ESSENTIAL HYPERTENSION: ICD-10-CM

## 2022-08-03 DIAGNOSIS — E55.9 VITAMIN D DEFICIENCY: ICD-10-CM

## 2022-08-03 DIAGNOSIS — Z11.59 NEED FOR HEPATITIS C SCREENING TEST: ICD-10-CM

## 2022-08-03 DIAGNOSIS — E11.9 TYPE 2 DIABETES MELLITUS WITHOUT COMPLICATION, WITHOUT LONG-TERM CURRENT USE OF INSULIN (HCC): Primary | ICD-10-CM

## 2022-08-03 LAB — HBA1C MFR BLD HPLC: 5.6 %

## 2022-08-03 PROCEDURE — 99214 OFFICE O/P EST MOD 30 MIN: CPT | Performed by: NURSE PRACTITIONER

## 2022-08-03 PROCEDURE — 83036 HEMOGLOBIN GLYCOSYLATED A1C: CPT | Performed by: NURSE PRACTITIONER

## 2022-08-03 PROCEDURE — 36416 COLLJ CAPILLARY BLOOD SPEC: CPT | Performed by: NURSE PRACTITIONER

## 2022-08-03 PROCEDURE — 3044F HG A1C LEVEL LT 7.0%: CPT | Performed by: NURSE PRACTITIONER

## 2022-08-03 RX ORDER — METFORMIN HYDROCHLORIDE 500 MG/1
500 TABLET, FILM COATED, EXTENDED RELEASE ORAL DAILY
Qty: 90 TABLET | Refills: 1 | Status: SHIPPED | OUTPATIENT
Start: 2022-08-03

## 2022-08-03 RX ORDER — LOSARTAN POTASSIUM 50 MG/1
50 TABLET ORAL DAILY
Qty: 60 TABLET | Refills: 1 | Status: SHIPPED | OUTPATIENT
Start: 2022-08-03

## 2022-08-03 NOTE — PROGRESS NOTES
History of Present Illness  Alcides Robin is a 63 y.o. male who presents today for:    Chief Complaint   Patient presents with    Follow-up     Past Medical History  Past Medical History:   Diagnosis Date    Elevated PSA     Gout     Gout     High cholesterol     Hyperlipemia     Hypertension     Pulmonary disease         Surgical History  Past Surgical History:   Procedure Laterality Date    COLONOSCOPY N/A 5/28/2021    COLONOSCOPY (TIVA) performed by Mark Deutsch MD at [facility] ENDOSCOPY    HX OTHER SURGICAL      cyst removed-foot        Current Medications  Current Outpatient Medications   Medication Sig    metFORMIN (GLUMETZA ER) 500 mg TG24 24 hour tablet Take 500 mg by mouth in the morning. losartan (COZAAR) 50 mg tablet Take 1 Tablet by mouth in the morning. colchicine 0.6 mg tablet Take 1 Tablet by mouth daily. (Patient not taking: Reported on 8/3/2022)    atorvastatin (LIPITOR) 10 mg tablet TAKE 1 TABLET BY MOUTH ONCE DAILY FOR HIGH CHOLESTEROL (Patient not taking: Reported on 8/3/2022)    albuterol (ProAir HFA) 90 mcg/actuation inhaler Take 2 Puffs by inhalation every six (6) hours as needed for Wheezing. Indications: bronchospasm prevention (Patient not taking: Reported on 8/3/2022)    montelukast (SINGULAIR) 10 mg tablet Take 1 Tab by mouth daily. Indications: controller medication for asthma (Patient not taking: Reported on 8/3/2022)     No current facility-administered medications for this visit. Allergies/Drug Reactions  Allergies   Allergen Reactions    Stings [Sting, Bee] Swelling        Family History  Family History   Problem Relation Age of Onset    Hypertension Sister     Cancer Maternal Grandmother         colon    Cancer Maternal Grandfather         bone    Asthma Mother         Social History  Social History     Tobacco Use    Smoking status: Former     Types: Cigarettes    Smokeless tobacco: Never   Substance Use Topics    Alcohol use:  Yes     Alcohol/week: 5.0 standard drinks     Types: 1 Glasses of wine, 2 Cans of beer, 2 Shots of liquor per week     Comment: sometimes every day    Drug use: No        Health Maintenance   Topic Date Due    Hepatitis C Screening  Never done    COVID-19 Vaccine (1) Never done    Foot Exam Q1  Never done    Eye Exam Retinal or Dilated  Never done    DTaP/Tdap/Td series (1 - Tdap) Never done    Shingrix Vaccine Age 50> (1 of 2) Never done    Pneumococcal 0-64 years (2 - PCV) 09/19/2020    MICROALBUMIN Q1  02/17/2022    Lipid Screen  02/17/2022    Depression Screen  04/20/2022    Flu Vaccine (1) 09/01/2022    A1C test (Diabetic or Prediabetic)  08/03/2023    Colorectal Cancer Screening Combo  05/28/2031     Immunization History   Administered Date(s) Administered    Pneumococcal Polysaccharide (PPSV-23) 09/19/2019     Physical Exam  Vital signs:   Vitals:    08/03/22 1012 08/03/22 1016   BP: (!) 154/73 (!) 152/80   Pulse: 70    Resp: 20    Temp: 97.7 °F (36.5 °C)    TempSrc: Temporal    SpO2: 98%    Weight: 242 lb (109.8 kg)    Height: 5' 9\" (1.753 m)        Physical Exam     Laboratory/Tests:  Office Visit on 08/03/2022   Component Date Value Ref Range Status    Hemoglobin A1c (POC) 08/03/2022 5.6  % Final     Patient reported to examination room with mother. Patient lives with sister to help due to sister's CVA. Patient reports colonoscopy performed on 5/28/2021. Patient has history of type 2 diabetes and at last visit with PCP in this practice on 4/20/2021 is prescribed Glipizide 2.5 mg tablet daily and Metformin 500 mg tablet daily were discontinued. A1c = 5.8 on 2/17/2021. Though patient's Metformin 5 mg tablet was discontinued he has been taking it for the last approximate 4 months. We will continue Metformin 500 mg tablet daily at this time. POC A1c = 5.6 on 8/3/2022. PSA = 21.90 on 2/17/2021. Patient was referred to [facility] due to elevated PSA on 3/2/2021, but did not attend appointment.   Patient's mother reports she was unaware of patient's appointment. Will order PSA and refer patient to Urology at this visit. Patient's blood pressure was elevated during this visit and he is prescribed HCTZ 25 mg daily. Will discontinue HCTZ and prescribe Losartan 50 mg tablet at this visit. Assessment/Plan:    Type 2 diabetes. Prescribed Metformin 500 mg tablet daily for management of type 2 diabetes. POC A1c = 5.6 on 8/3/2022. Elevated PSA. Ordered repeat PSA lab and patient will be referred to urology for management of elevated PSA at this visit. Essential hypertension. Discontinued HCTZ and prescribed Losartan 50 mg tablet daily for management of essential hypertension. I have discussed the diagnosis with the patient and the intended plan as seen in the above orders. The patient has received an after-visit summary and questions were answered concerning future plans. I have discussed medication side effects and warnings with the patient as well. I have reviewed the plan of care with the patient, accepted their input and they are in agreement with the treatment goals.        Huma Singh NP  August 3, 2022

## 2022-08-03 NOTE — PROGRESS NOTES
Lc Sharif presents today for No chief complaint on file. Is someone accompanying this pt? yes    Is the patient using any DME equipment during [address]? no    Depression Screening:  3 most recent PHQ Screens 4/20/2021   Little interest or pleasure in doing things Not at all   Feeling down, depressed, irritable, or hopeless Not at all   Total Score PHQ 2 0       Learning Assessment:  No flowsheet data found. Fall Risk  Fall Risk Assessment, last 12 mths 2/15/2021   Able to walk? Yes   Fall in past 12 months? 0   Do you feel unsteady? 0   Are you worried about falling 0       ADL  ADL Assessment 4/20/2021   Feeding yourself No Help Needed   Getting from bed to chair No Help Needed   Getting dressed No Help Needed   Bathing or showering No Help Needed   Walk across the room (includes cane/walker) No Help Needed   Using the telphone No Help Needed   Taking your medications No Help Needed   Preparing meals No Help Needed   Managing money (expenses/bills) No Help Needed   Moderately strenuous housework (laundry) No Help Needed   Shopping for personal items (toiletries/medicines) No Help Needed   Shopping for groceries No Help Needed   Driving No Help Needed   Climbing a flight of stairs No Help Needed   Getting to places beyond walking distances No Help Needed       Health Maintenance reviewed and discussed and ordered per Provider. Health Maintenance Due   Topic Date Due    Hepatitis C Screening  Never done    COVID-19 Vaccine (1) Never done    Foot Exam Q1  Never done    Eye Exam Retinal or Dilated  Never done    DTaP/Tdap/Td series (1 - Tdap) Never done    Shingrix Vaccine Age 50> (1 of 2) Never done    Pneumococcal 0-64 years (2 - PCV) 09/19/2020    A1C test (Diabetic or Prediabetic)  02/17/2022    MICROALBUMIN Q1  02/17/2022    Lipid Screen  02/17/2022    Depression Screen  04/20/2022   . Coordination of Care:  1. \"Have you been to the ER, urgent care clinic since your last visit?   Hospitalized since your last visit? \" No    2. \"Have you seen or consulted any other health care providers outside of the [address] [place] [address] since your last visit? \" No     3. For patients aged 39-70: Has the patient had a colonoscopy? Yes - no Care Gap present     If the patient is female:    4. For patients aged 41-77: Has the patient had a mammogram within the past 2 years? NA - based on age/sex    5. For patients aged 21-65: Has the patient had a pap smear? NA - based on age /sex    Fingerstick for HBa1C done in right  first finger by Shyam Lai LPN per order of Charla Palacio NP after cleaning area with alcohol wipe. Patient tolerated procedure well.

## 2022-08-15 ENCOUNTER — HOSPITAL ENCOUNTER (OUTPATIENT)
Dept: LAB | Age: 62
Discharge: HOME | End: 2022-08-15

## 2022-08-17 ENCOUNTER — TELEPHONE (OUTPATIENT)
Dept: FAMILY MEDICINE CLINIC | Age: 62
End: 2022-08-17

## 2022-08-17 DIAGNOSIS — R97.20 ELEVATED PSA: Primary | ICD-10-CM

## 2022-08-17 DIAGNOSIS — E55.9 VITAMIN D DEFICIENCY: ICD-10-CM

## 2022-08-17 RX ORDER — FINASTERIDE 5 MG/1
5 TABLET, FILM COATED ORAL DAILY
Qty: 30 TABLET | Refills: 2 | Status: SHIPPED | OUTPATIENT
Start: 2022-08-17

## 2022-08-17 RX ORDER — ERGOCALCIFEROL 1.25 MG/1
50000 CAPSULE ORAL
Qty: 13 CAPSULE | Refills: 3 | Status: SHIPPED | OUTPATIENT
Start: 2022-08-17

## 2022-08-17 NOTE — TELEPHONE ENCOUNTER
Spoke with patient's mother on 8/17/2022 at 3:50 PM of patient's laboratory results. PSA = 49.80 on 8/16/2022 and was previously 21.90 on 2/17/2021. Patient was referred to urology at last visit and patient's mother reports he has appointment with urology on September 20, 2022. We will prescribe Finasteride 5 mg tablet daily. Vitamin D = 17.4 on 8/16/2022 and will prescribe Ergocalciferol 50,000 units at this visit. Patient's mother reports she will confirm patient's urology appointment when she arrives at residence.

## 2022-08-18 ENCOUNTER — TELEPHONE (OUTPATIENT)
Dept: FAMILY MEDICINE CLINIC | Age: 62
End: 2022-08-18

## 2022-08-18 NOTE — TELEPHONE ENCOUNTER
----- Message from Frida Machadoing sent at 8/18/2022 10:14 AM EDT -----  Subject: Message to Provider    QUESTIONS  Information for Provider? PT's mother called in to inform his PCP that he   has an apt 9/20/2022 at 9:30am for his prostrate. [address], [address] [place] [address], Tel? [phone], Fax? [phone] with Jeanmarie Allison MD  ---------------------------------------------------------------------------  --------------  [facility] [employer]  [phone]; OK to leave message on voicemail  ---------------------------------------------------------------------------  --------------  SCRIPT ANSWERS  Relationship to Patient? Parent  Representative Name? Becki Duncan   Patient is under 25 and the Parent has custody? No  Is the Representative on the appropriate HIPAA document in Epic?  Yes

## 2022-09-18 NOTE — PROGRESS NOTES
HISTORY OF PRESENT ILLNESS    Alanna Torres is a 63 y.o. male is a new patient is here for elevated PSA. Patient is under his mother's care, he was supposed to go see urologist with PSA was 21 is now doubled. I explained to her the urgency and that is 10 times normal.  5/15/22=49.5  7/12/22-PSA=48.8  8/15/22=49.8    As per patient's PCP note  PSA = 21.90 on 2/17/2021. Patient was referred to [facility] due to elevated PSA on 3/2/2021, but did not attend an appointment. Talk to his mother in great detail. His rectum was too tight to get a finger and to do a prostate exam.  He will need a rectal exam under anesthesia as well as a prostate biopsy. He and his mother are aware the risk of bleeding infection sepsis and death aware of alternatives and have no questions  His recent PSA is 49.8 from 8/15/22     He has no history of prostate cancer in his family. He denies any urgency, weak stream, blood in his urine,N/V or chills. He has some frequency during the day 5-7 days, and night time 2-3 times. He is not bothered with his urinary symptoms    Past Medical History:  PMHx (including negatives):  has a past medical history of Diabetes ([address] Elevated PSA, Gout, Gout, High cholesterol, Hyperlipemia, Hypertension, and Pulmonary disease. He has no past medical history of Nausea & vomiting. PSurgHx:  has a past surgical history that includes hx other surgical and colonoscopy (N/A, 5/28/2021). PSocHx:  reports that he has quit smoking. His smoking use included cigarettes. He has never used smokeless tobacco. He reports current alcohol use of about 12.0 standard drinks per week. He reports that he does not use drugs. Home Medications    Medication Sig Start Date End Date Taking? Authorizing Provider   ergocalciferol (ERGOCALCIFEROL) 1,250 mcg (50,000 unit) capsule Take 1 Capsule by mouth every seven (7) days.  8/17/22  Yes Arthea Boeck D, NP   finasteride (PROSCAR) 5 mg tablet Take 1 Tablet by mouth daily. 8/17/22  Yes Diana WALTON NP   [facility]) 500 mg TG24 24 hour tablet Take 500 mg by mouth in the morning. 8/3/22  Yes Diana WALTON NP   losartan (COZAAR) 50 mg tablet Take 1 Tablet by mouth in the morning. 8/3/22  Yes Diana WALTON NP   colchicine 0.6 mg tablet Take 1 Tablet by mouth daily. Patient not taking: No sig reported 5/17/22   Diana WALTON NP   atorvastatin (LIPITOR) 10 mg tablet TAKE 1 TABLET BY MOUTH ONCE DAILY FOR HIGH CHOLESTEROL  Patient not taking: No sig reported 11/29/21   Rose Rosenthal MD   albuterol (ProAir HFA) 90 mcg/actuation inhaler Take 2 Puffs by inhalation every six (6) hours as needed for Wheezing. Indications: bronchospasm prevention  Patient not taking: No sig reported 2/15/21   Malka Menezes NP   montelukast (SINGULAIR) 10 mg tablet Take 1 Tab by mouth daily. Indications: controller medication for asthma  Patient not taking: No sig reported 2/15/21   Malka Menezes NP        Chronic Conditions Addressed Today       1. Elevated PSA - Primary     Relevant Orders     AMB POC URINALYSIS DIP STICK AUTO W/O MICRO       Review of Systems   Constitutional: Negative. HENT: Negative. Eyes: Negative. Cardiovascular: Negative. Genitourinary:  Positive for frequency. Musculoskeletal: Negative. Skin: Negative. Neurological: Negative. Endo/Heme/Allergies: Negative. Psychiatric/Behavioral: Negative. Patient denies the symptoms of COVID-19 per routine screening guidelines. Physical Exam  Vitals and nursing note reviewed. Constitutional:       Appearance: Normal appearance. He is obese. HENT:      Head: Normocephalic. Mouth/Throat:      Mouth: Mucous membranes are moist.   Eyes:      Pupils: Pupils are equal, round, and reactive to light. Cardiovascular:      Rate and Rhythm: Normal rate.    Pulmonary:      Effort: Pulmonary effort is normal.   Abdominal:      General: Abdomen is flat. Palpations: Abdomen is soft. Genitourinary:     Comments: Rectum too tight to get a finger in  Musculoskeletal:         General: Normal range of motion. Cervical back: Normal range of motion. Skin:     General: Skin is warm. Neurological:      Mental Status: He is alert and oriented to person, place, and time. Mental status is at baseline. Psychiatric:         Mood and Affect: Mood normal.         Behavior: Behavior normal.       ASSESSMENT and PLAN  Diagnoses and all orders for this visit:    1. Elevated PSA  -     AMB POC URINALYSIS DIP STICK AUTO W/O MICRO     Patient needs a TRUS under anesthesia with biopsy there is aware the risk of bleeding infection sepsis we talked about bleeding in his urine and his anus and blood in his sperm his mother understands and patient appears to be under his mother's direct care. She will be performed by Dr. Bhumika Lawton family understands as they have no questions          Chris Murphy may have a reminder for a \"due or due soon\" health maintenance. The patient has been encouraged to contact their primary care provider for follow-up on this health maintenance or other necessary and/or routine health screening.      Dony Gibbs NP

## 2022-09-20 ENCOUNTER — OFFICE VISIT (OUTPATIENT)
Dept: UROLOGY | Age: 62
End: 2022-09-20
Payer: MEDICAID

## 2022-09-20 VITALS
DIASTOLIC BLOOD PRESSURE: 95 MMHG | HEART RATE: 67 BPM | WEIGHT: 242 LBS | HEIGHT: 69 IN | BODY MASS INDEX: 35.84 KG/M2 | OXYGEN SATURATION: 100 % | SYSTOLIC BLOOD PRESSURE: 174 MMHG | TEMPERATURE: 97.8 F

## 2022-09-20 DIAGNOSIS — E11.9 TYPE 2 DIABETES MELLITUS WITHOUT COMPLICATION, WITHOUT LONG-TERM CURRENT USE OF INSULIN (HCC): ICD-10-CM

## 2022-09-20 DIAGNOSIS — R97.20 ELEVATED PSA: Primary | ICD-10-CM

## 2022-09-20 LAB
BILIRUB UR QL: NEGATIVE
GLUCOSE UR-MCNC: NEGATIVE MG/DL
KETONES P FAST UR STRIP-MCNC: NEGATIVE MG/DL
PH UR STRIP: 5.5 [PH] (ref 4.6–8)
PROT UR QL STRIP: NEGATIVE
SP GR UR STRIP: 1.02 (ref 1–1.03)
UA UROBILINOGEN AMB POC: NORMAL (ref 0.2–1)
URINALYSIS CLARITY POC: CLEAR
URINALYSIS COLOR POC: YELLOW
URINE BLOOD POC: NORMAL
URINE LEUKOCYTES POC: NEGATIVE
URINE NITRITES POC: NEGATIVE

## 2022-09-20 PROCEDURE — 81003 URINALYSIS AUTO W/O SCOPE: CPT | Performed by: UROLOGY

## 2022-09-20 PROCEDURE — 99204 OFFICE O/P NEW MOD 45 MIN: CPT | Performed by: UROLOGY

## 2022-09-20 NOTE — PROGRESS NOTES
Chief Complaint   Patient presents with    New Patient    Elevated PSA     1. Have you been to the ER, urgent care clinic since your last visit? Hospitalized since your last visit? No    2. Have you seen or consulted any other health care providers outside of the [address] [place] [address] since your last visit? Include any pap smears or colon screening.  Yes Where: dr [facility]   Visit Vitals  BP (!) 174/95 (BP 1 Location: Right upper arm, BP Patient Position: Sitting, BP Cuff Size: Large adult)   Pulse 67   Temp 97.8 °F (36.6 °C)   Ht 5' 9\" (1.753 m)   Wt 242 lb (109.8 kg)   SpO2 100%   BMI 35.74 kg/m²

## 2022-10-31 ENCOUNTER — OFFICE VISIT (OUTPATIENT)
Dept: FAMILY MEDICINE CLINIC | Age: 62
End: 2022-10-31
Payer: MEDICAID

## 2022-10-31 VITALS
HEART RATE: 67 BPM | BODY MASS INDEX: 35.3 KG/M2 | HEIGHT: 70 IN | SYSTOLIC BLOOD PRESSURE: 133 MMHG | RESPIRATION RATE: 18 BRPM | DIASTOLIC BLOOD PRESSURE: 72 MMHG | WEIGHT: 246.6 LBS | OXYGEN SATURATION: 98 %

## 2022-10-31 DIAGNOSIS — H61.23 BILATERAL IMPACTED CERUMEN: ICD-10-CM

## 2022-10-31 DIAGNOSIS — R97.20 ELEVATED PSA: Primary | ICD-10-CM

## 2022-10-31 DIAGNOSIS — E11.9 TYPE 2 DIABETES MELLITUS WITHOUT COMPLICATION, WITHOUT LONG-TERM CURRENT USE OF INSULIN (HCC): ICD-10-CM

## 2022-10-31 DIAGNOSIS — R97.20 ELEVATED PSA: ICD-10-CM

## 2022-10-31 PROCEDURE — 99214 OFFICE O/P EST MOD 30 MIN: CPT | Performed by: NURSE PRACTITIONER

## 2022-10-31 PROCEDURE — 3044F HG A1C LEVEL LT 7.0%: CPT | Performed by: NURSE PRACTITIONER

## 2022-10-31 PROCEDURE — 3078F DIAST BP <80 MM HG: CPT | Performed by: NURSE PRACTITIONER

## 2022-10-31 PROCEDURE — 3075F SYST BP GE 130 - 139MM HG: CPT | Performed by: NURSE PRACTITIONER

## 2022-10-31 NOTE — PROGRESS NOTES
History of Present Illness  Phillip Fried is a 63 y.o. male who presents today for:    Chief Complaint   Patient presents with    Wax in Ear     Pt here for ear cleaning. PT reports having hearing loss      Past Medical History  Past Medical History:   Diagnosis Date    Diabetes ([address]     Elevated PSA     Gout     Gout     High cholesterol     Hyperlipemia     Hypertension     Pulmonary disease         Surgical History  Past Surgical History:   Procedure Laterality Date    COLONOSCOPY N/A 5/28/2021    COLONOSCOPY (TIVA) performed by Tian Conway MD at [facility] ENDOSCOPY    HX OTHER SURGICAL      cyst removed-foot        Current Medications  Current Outpatient Medications   Medication Sig    ergocalciferol (ERGOCALCIFEROL) 1,250 mcg (50,000 unit) capsule Take 1 Capsule by mouth every seven (7) days. finasteride (PROSCAR) 5 mg tablet Take 1 Tablet by mouth daily. metFORMIN (GLUMETZA ER) 500 mg TG24 24 hour tablet Take 500 mg by mouth in the morning. losartan (COZAAR) 50 mg tablet Take 1 Tablet by mouth in the morning.    montelukast (SINGULAIR) 10 mg tablet Take 1 Tab by mouth daily. Indications: controller medication for asthma    colchicine 0.6 mg tablet Take 1 Tablet by mouth daily. (Patient not taking: No sig reported)    atorvastatin (LIPITOR) 10 mg tablet TAKE 1 TABLET BY MOUTH ONCE DAILY FOR HIGH CHOLESTEROL (Patient not taking: No sig reported)    albuterol (ProAir HFA) 90 mcg/actuation inhaler Take 2 Puffs by inhalation every six (6) hours as needed for Wheezing. Indications: bronchospasm prevention (Patient not taking: No sig reported)     No current facility-administered medications for this visit.        Allergies/Drug Reactions  Allergies   Allergen Reactions    Stings [Sting, Bee] Swelling        Family History  Family History   Problem Relation Age of Onset    Hypertension Sister     Cancer Maternal Grandmother         colon    Cancer Maternal Grandfather         bone    Asthma Mother Social History  Social History     Tobacco Use    Smoking status: Former     Years: 45.00     Types: Cigarettes    Smokeless tobacco: Never   Vaping Use    Vaping Use: Never used   Substance Use Topics    Alcohol use:  Yes     Alcohol/week: 12.0 standard drinks     Types: 12 Cans of beer per week     Comment: pint of liquor a week    Drug use: Never        Health Maintenance   Topic Date Due    COVID-19 Vaccine (1) Never done    Foot Exam Q1  Never done    Eye Exam Retinal or Dilated  Never done    DTaP/Tdap/Td series (1 - Tdap) Never done    Shingrix Vaccine Age 50> (1 of 2) Never done    Flu Vaccine (1) Never done    Depression Screen  08/03/2023    A1C test (Diabetic or Prediabetic)  08/03/2023    MICROALBUMIN Q1  08/16/2023    Lipid Screen  08/16/2023    Colorectal Cancer Screening Combo  05/28/2031    Hepatitis C Screening  Completed    Pneumococcal 0-64 years  Completed     Immunization History   Administered Date(s) Administered    Pneumococcal Polysaccharide (PPSV-23) 09/19/2019     Physical Exam  Vital signs:   Vitals:    10/31/22 1614 10/31/22 1616   BP: (!) 146/74 133/72   Pulse: 68 67   Resp: 18 18   SpO2: 98% 98%   Weight: 246 lb 9.6 oz (111.9 kg)    Height: 5' 10\" (1.778 m)      Laboratory/Tests:  Office Visit on 09/20/2022   Component Date Value Ref Range Status    Color (UA POC) 09/20/2022 Yellow   Final    Clarity (UA POC) 09/20/2022 Clear   Final    Glucose (UA POC) 09/20/2022 Negative  Negative mg/dL Final    Bilirubin (UA POC) 09/20/2022 Negative  Negative Final    Ketones (UA POC) 09/20/2022 Negative  Negative Final    Specific gravity (UA POC) 09/20/2022 1.025  1.001 - 1.035 Final    Blood (UA POC) 09/20/2022 Trace-intact  Negative Final    pH (UA POC) 09/20/2022 5.5  4.6 - 8.0 Final    Protein (UA POC) 09/20/2022 Negative  Negative Final    Urobilinogen (UA POC) 09/20/2022 0.2 mg/dL  0.2 - 1 Final    Nitrites (UA POC) 09/20/2022 Negative  Negative Final    Leukocyte esterase (UA POC) 09/20/2022 Negative  Negative Final   Office Visit on 08/03/2022   Component Date Value Ref Range Status    Hemoglobin A1c (POC) 08/03/2022 5.6  % Final     PSA = 49.8 on 8/15/2022 patient was prescribed Finasteride 5 mg tablet daily and referred to urology. Patient was evaluated by Dr. Aniket Caicedo, urologist, on 9/20/2022. Digital rectal exam was unable to be performed by urology and Dr. Aniket Caicedo recommended rectal exam under anesthesia as well as prostate biopsy. Patient will have upcoming appointment for procedure, but has yet to have appointment scheduled. Physical examination performed:  Left ear unable to visualize tympanic membrane due to cerumen impaction. Performed left ear cerumen disimpaction with instrumentation successfully at this visit. Subsequently, visualized left ear tympanic membrane during otoscopic examination with no abnormalities. Right ear unable to visualize tympanic membrane due to cerumen impaction. Unable to perform successful cerumen disimpaction with instrumentation at this visit and will prescribe Debrox. Assessment/Plan:    Elevated PSA. Ordered repeat PSA at this visit, continue follow-up with urology as directed and continue Finasteride 5 mg tablet daily. Bilateral ear cerumen impaction. Performed bilateral ear cerumen disimpaction with instrumentation at this visit. Prescribed Carbamide peroxide (Debrox) 6.5 otic solution, instill 5 drops in each ear twice daily for management of bilateral ear cerumen impaction. Type 2 diabetes. Continue Metformin 500 mg tablet daily for management of type 2 diabetes. I have discussed the diagnosis with the patient and the intended plan as seen in the above orders. The patient has received an after-visit summary and questions were answered concerning future plans. I have discussed medication side effects and warnings with the patient as well.  I have reviewed the plan of care with the patient, accepted their input and they are in agreement with the treatment goals.        Luiz Bella NP  October 31, 2022

## 2022-10-31 NOTE — PROGRESS NOTES
Gold Topete presents today for   Chief Complaint   Patient presents with    Wax in Ear     Pt here for ear cleaning. PT reports having hearing loss        Is someone accompanying this pt? Yes , mother   (name)  (relationship) (temp )    Is the patient using any DME equipment during OV? NO    Depression Screening:  3 most recent PHQ Screens 10/31/2022   Little interest or pleasure in doing things Not at all   Feeling down, depressed, irritable, or hopeless Not at all   Total Score PHQ 2 0       Learning Assessment:  No flowsheet data found. Fall Risk  Fall Risk Assessment, last 12 mths 2/15/2021   Able to walk? Yes   Fall in past 12 months? 0   Do you feel unsteady? 0   Are you worried about falling 0       ADL  ADL Assessment 8/3/2022   Feeding yourself No Help Needed   Getting from bed to chair No Help Needed   Getting dressed No Help Needed   Bathing or showering No Help Needed   Walk across the room (includes cane/walker) No Help Needed   Using the telphone No Help Needed   Taking your medications No Help Needed   Preparing meals No Help Needed   Managing money (expenses/bills) No Help Needed   Moderately strenuous housework (laundry) No Help Needed   Shopping for personal items (toiletries/medicines) No Help Needed   Shopping for groceries No Help Needed   Driving No Help Needed   Climbing a flight of stairs No Help Needed   Getting to places beyond walking distances No Help Needed       Health Maintenance reviewed and discussed and ordered per Provider. Health Maintenance Due   Topic Date Due    COVID-19 Vaccine (1) Never done    Foot Exam Q1  Never done    Eye Exam Retinal or Dilated  Never done    DTaP/Tdap/Td series (1 - Tdap) Never done    Shingrix Vaccine Age 50> (1 of 2) Never done    Flu Vaccine (1) Never done   . Coordination of Care:  1. \"Have you been to the ER, urgent care clinic since your last visit? Hospitalized since your last visit? \" No    2.  \"Have you seen or consulted any other health care providers outside of the [address] [place] [address] since your last visit? \" No     3. For patients aged 39-70: Has the patient had a colonoscopy? Yes - no Care Gap present     If the patient is female:    4. For patients aged 41-77: Has the patient had a mammogram within the past 2 years? No    5. For patients aged 21-65: Has the patient had a pap smear?  No

## 2022-11-08 ENCOUNTER — HOSPITAL ENCOUNTER (EMERGENCY)
Age: 62
Discharge: HOME | End: 2022-11-08
Attending: EMERGENCY MEDICINE
Payer: MEDICAID

## 2022-11-08 VITALS
WEIGHT: 250 LBS | BODY MASS INDEX: 37.03 KG/M2 | DIASTOLIC BLOOD PRESSURE: 92 MMHG | SYSTOLIC BLOOD PRESSURE: 164 MMHG | TEMPERATURE: 97.9 F | RESPIRATION RATE: 16 BRPM | HEIGHT: 69 IN | HEART RATE: 61 BPM | OXYGEN SATURATION: 100 %

## 2022-11-08 DIAGNOSIS — F41.1 ANXIETY STATE: Primary | ICD-10-CM
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
## 2022-11-08 PROCEDURE — 99282 EMERGENCY DEPT VISIT SF MDM: CPT
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
## 2023-03-14 ENCOUNTER — APPOINTMENT (OUTPATIENT)
Age: 63
End: 2023-03-14
Payer: MEDICAID

## 2023-03-14 ENCOUNTER — HOSPITAL ENCOUNTER (EMERGENCY)
Age: 63
Discharge: HOME | End: 2023-03-14
Attending: EMERGENCY MEDICINE
Payer: MEDICAID

## 2023-03-14 VITALS
SYSTOLIC BLOOD PRESSURE: 189 MMHG | DIASTOLIC BLOOD PRESSURE: 96 MMHG | RESPIRATION RATE: 18 BRPM | TEMPERATURE: 97.8 F | OXYGEN SATURATION: 100 % | HEART RATE: 88 BPM

## 2023-03-14 DIAGNOSIS — L03.011 CELLULITIS OF RIGHT THUMB: ICD-10-CM

## 2023-03-14 DIAGNOSIS — L03.011 FELON OF FINGER OF RIGHT HAND: Primary | ICD-10-CM

## 2023-03-14 LAB
GLUCOSE BLD STRIP.AUTO-MCNC: 113 MG/DL (ref 70–110)
PERFORMED BY:: ABNORMAL

## 2023-03-14 PROCEDURE — 73140 X-RAY EXAM OF FINGER(S): CPT

## 2023-03-14 PROCEDURE — 99283 EMERGENCY DEPT VISIT LOW MDM: CPT

## 2023-03-14 PROCEDURE — 82962 GLUCOSE BLOOD TEST: CPT

## 2023-03-14 RX ORDER — AMOXICILLIN AND CLAVULANATE POTASSIUM 875; 125 MG/1; MG/1
1 TABLET, FILM COATED ORAL 2 TIMES DAILY
Qty: 14 TABLET | Refills: 0 | Status: SHIPPED | OUTPATIENT
Start: 2023-03-14 | End: 2023-03-21

## 2023-03-14 ASSESSMENT — LIFESTYLE VARIABLES
HOW MANY STANDARD DRINKS CONTAINING ALCOHOL DO YOU HAVE ON A TYPICAL DAY: PATIENT DOES NOT DRINK
HOW OFTEN DO YOU HAVE A DRINK CONTAINING ALCOHOL: NEVER

## 2023-03-14 NOTE — DISCHARGE INSTRUCTIONS
Wash your infected thumb twice per day with soap and water. Additionally, soak your thumb twice a day and Epsom salt and warm water. Take the antibiotics as prescribed. Plan to  a probiotic, which is over-the-counter, and take it more than 1 hour before or more than 1 hour after your Augmentin once per day for the next 7 days. Follow-up with your primary care provider in 1 week to determine if the thumb infection has improved.

## 2023-03-14 NOTE — ED NOTES
R thumb cleansed with wound cleanser and patted dry. Dry sterile dressing applied. Mom educated on care of site. States understanding.       Kushal Pittman RN  03/14/23 1041

## 2023-03-14 NOTE — ED PROVIDER NOTES
[facility] EMERGENCY DEPT  EMERGENCY DEPARTMENT ENCOUNTER      Pt Name: Rafat Brunner  MRN: [id number]  [place] 1960  Date of evaluation: 3/14/2023  Provider: Brii Crawford MD    [address] [place] [address]       Chief Complaint   Patient presents with    Finger Injury         HISTORY OF PRESENT ILLNESS   (Location/Symptom, Timing/Onset, Context/Setting, Quality, Duration, Modifying Factors, Severity)  Note limiting factors. Rafat Brunner is a 63 y.o. male with past medical history significant for hypertension, autism who presents to the emergency department for delayed evaluation of right thumb pain. Patient did not reveal to his mother about the right thumb pain until this morning. Patient does not recall any specific trauma. History is limited by his low intellectual functioning. The history is provided by the patient, a relative and medical records. Nursing Notes were reviewed. REVIEW OF SYSTEMS    (2-9 systems for level 4, 10 or more for level 5)     Review of Systems   All other systems reviewed and are negative. Except as noted above the remainder of the review of systems was reviewed and negative.        PAST MEDICAL HISTORY     Past Medical History:   Diagnosis Date    Diabetes ([address]     Elevated PSA     Gout     Gout     High cholesterol     Hyperlipemia     Hypertension     Pulmonary disease          SURGICAL HISTORY       Past Surgical History:   Procedure Laterality Date    COLONOSCOPY N/A 5/28/2021    COLONOSCOPY (TIVA) performed by Tricia Randolph MD at [facility] ENDOSCOPY    OTHER SURGICAL HISTORY      cyst removed-foot         CURRENT MEDICATIONS       Discharge Medication List as of 3/14/2023  1:57 PM        CONTINUE these medications which have NOT CHANGED    Details   albuterol sulfate HFA (PROVENTIL;VENTOLIN;PROAIR) 108 (90 Base) MCG/ACT inhaler Inhale 2 puffs into the lungs every 6 hours as neededHistorical Med      atorvastatin (LIPITOR) 10 MG tablet TAKE 1 TABLET BY MOUTH ONCE DAILY FOR HIGH CHOLESTEROLHistorical Med      carbamide peroxide (DEBROX) 6.5 % otic solution Place 5 drops in ear(s) 2 times dailyHistorical Med      colchicine (COLCRYS) 0.6 MG tablet Take 1 tablet by mouth once dailyHistorical Med      ergocalciferol (ERGOCALCIFEROL) 1.25 MG (98002 UT) capsule Take 50,000 Units by mouth every 7 daysHistorical Med      finasteride (PROSCAR) 5 MG tablet Take 5 mg by mouth dailyHistorical Med      losartan (COZAAR) 50 MG tablet Take 50 mg by mouth dailyHistorical Med      metFORMIN, MOD, (GLUMETZA) 500 MG extended release tablet Take 500 mg by mouth dailyHistorical Med      montelukast (SINGULAIR) 10 MG tablet Take 10 mg by mouth dailyHistorical Med             ALLERGIES     Bee venom    FAMILY HISTORY       Family History   Problem Relation Age of Onset    Cancer Maternal Grandmother         colon    Cancer Maternal Grandfather         bone    Asthma Mother     Hypertension Sister           SOCIAL HISTORY       Social History     Socioeconomic History    Marital status: Single     Spouse name: None    Number of children: None    Years of education: None    Highest education level: None   Tobacco Use    Smoking status: Former    Smokeless tobacco: Never   Substance and Sexual Activity    Alcohol use: Yes     Alcohol/week: 12.0 standard drinks     Types: 12 Standard drinks or equivalent per week     Comment: socially    Drug use: Never       SCREENINGS         [place] Coma Scale  Eye Opening: Spontaneous  Best Verbal Response: Oriented  Best Motor Response: Obeys commands  [place] Coma Scale Score: 15                     CIWA Assessment  BP: (!) 189/96  Heart Rate: 88                 PHYSICAL EXAM    (up to 7 for level 4, 8 or more for level 5)     ED Triage Vitals [03/14/23 1327]   BP Temp Temp src Heart Rate Resp SpO2 Height Weight   (!) 189/96 97.8 °F (36.6 °C) -- 88 18 100 % -- --       Physical Exam  Vitals and nursing note reviewed.    Constitutional:       General: He is not in acute distress. Appearance: He is obese. He is not ill-appearing, toxic-appearing or diaphoretic. HENT:      Head: Normocephalic and atraumatic. Cardiovascular:      Rate and Rhythm: Normal rate and regular rhythm. Pulses: Normal pulses. Heart sounds: Normal heart sounds. No murmur heard. No friction rub. No gallop. Pulmonary:      Effort: Pulmonary effort is normal. No respiratory distress. Breath sounds: Normal breath sounds. No stridor. No wheezing, rhonchi or rales. Chest:      Chest wall: No tenderness. Musculoskeletal:         General: Swelling present. No tenderness, deformity or signs of injury. Normal range of motion. Right lower leg: No edema. Left lower leg: No edema. Skin:     General: Skin is warm. Capillary Refill: Capillary refill takes less than 2 seconds. Coloration: Skin is not jaundiced or pale. Findings: Erythema present. No bruising, lesion or rash. Comments: Right radial thumb side erythema as well as eponychium with full passive range of motion. Pain along the extensor or flexor sheath. No lymphangitic streaking or brachial adenopathy. Neurological:      General: No focal deficit present. Mental Status: He is alert and oriented to person, place, and time. Cranial Nerves: No cranial nerve deficit. Sensory: No sensory deficit. Motor: No weakness.       Coordination: Coordination normal.      Gait: Gait normal.      Deep Tendon Reflexes: Reflexes normal.       DIAGNOSTIC RESULTS     EKG: All EKG's are interpreted by the Emergency Department Physician who either signs or Co-signs this chart in the absence of a cardiologist.        RADIOLOGY:   Non-plain film images such as CT, Ultrasound and MRI are read by the radiologist. Plain radiographic images are visualized and preliminarily interpreted by the emergency physician with the below findings:        Interpretation per the Radiologist below, if available at the time of this note:    XR FINGER RIGHT (MIN 2 VIEWS)   Final Result   Soft tissue swelling. DJD. No evidence of fracture or dislocation. ED BEDSIDE ULTRASOUND:   Performed by ED Physician - none    LABS:  Labs Reviewed   POCT GLUCOSE - Abnormal; Notable for the following components:       Result Value    POC Glucose 113 (*)     All other components within normal limits   POCT GLUCOSE       All other labs were within normal range or not returned as of this dictation. EMERGENCY DEPARTMENT COURSE and DIFFERENTIAL DIAGNOSIS/MDM:   Vitals:    Vitals:    03/14/23 1327   BP: (!) 189/96   Pulse: 88   Resp: 18   Temp: 97.8 °F (36.6 °C)   SpO2: 100%           Medical Decision Making  Primary concern for paronychia. Low concern for flexor tenosynovitis. He will be debrided at the bedside. The wound was already open and expressing a large amount of purulent material.  The wound was cleaned and debrided by me. He will be dressed, testing for his glucose to screen for DKA and then x-ray for occult fracture that may have incited the thumb infection. Patient's past medical history contributes to today's visit and necessitates need for x-ray, point-of-care glucose. I have personally interpreted his vital signs and x-ray as well as glucose. Amount and/or Complexity of Data Reviewed  Independent Historian: parent  External Data Reviewed: labs and radiology. Labs: ordered. Radiology: ordered. Risk  Prescription drug management. REASSESSMENT          CRITICAL CARE TIME   Total Critical Care time was  minutes, excluding separately reportable procedures. There was a high probability of clinically significant/life threatening deterioration in the patient's condition which required my urgent intervention. CONSULTS:  None    PROCEDURES:  Unless otherwise noted below, none     Procedures        FINAL IMPRESSION      1. Felon of finger of right hand    2.  Cellulitis of right thumb DISPOSITION/PLAN   DISPOSITION Decision To Discharge 03/14/2023 01:56:00 PM      PATIENT REFERRED TO:  BRYAN Newton NP  [address] [facility]  [facility]  [phone]    Schedule an appointment as soon as possible for a visit       DISCHARGE MEDICATIONS:  Discharge Medication List as of 3/14/2023  1:57 PM        START taking these medications    Details   amoxicillin-clavulanate (AUGMENTIN) 875-125 MG per tablet Take 1 tablet by mouth 2 times daily for 7 days, Disp-14 tablet, R-0Normal           Controlled Substances Monitoring:     No flowsheet data found.     (Please note that portions of this note were completed with a voice recognition program.  Efforts were made to edit the dictations but occasionally words are mis-transcribed.)    Eliza Roberts MD (electronically signed)  Attending Emergency Physician      2      Sophia Merino MD  03/14/23 [address] [facility] [address], [place]  03/16/23 6235

## 2023-03-17 RX ORDER — FINASTERIDE 5 MG/1
TABLET, FILM COATED ORAL
Qty: 90 TABLET | Refills: 0 | Status: SHIPPED | OUTPATIENT
Start: 2023-03-17

## 2023-04-23 ENCOUNTER — HOSPITAL ENCOUNTER (EMERGENCY)
Age: 63
Discharge: HOME | End: 2023-04-23
Attending: FAMILY MEDICINE
Payer: MEDICAID

## 2023-04-23 VITALS
HEIGHT: 70 IN | HEART RATE: 87 BPM | DIASTOLIC BLOOD PRESSURE: 85 MMHG | RESPIRATION RATE: 18 BRPM | SYSTOLIC BLOOD PRESSURE: 164 MMHG | WEIGHT: 244 LBS | BODY MASS INDEX: 34.93 KG/M2 | TEMPERATURE: 98.5 F | OXYGEN SATURATION: 100 %

## 2023-04-23 DIAGNOSIS — M10.9 GOUTY ARTHRITIS OF LEFT GREAT TOE: Primary | ICD-10-CM

## 2023-04-23 PROCEDURE — 6370000000 HC RX 637 (ALT 250 FOR IP): Performed by: FAMILY MEDICINE

## 2023-04-23 PROCEDURE — 99283 EMERGENCY DEPT VISIT LOW MDM: CPT

## 2023-04-23 RX ORDER — COLCHICINE 0.6 MG/1
0.6 TABLET ORAL DAILY
Qty: 15 TABLET | Refills: 0 | Status: SHIPPED | OUTPATIENT
Start: 2023-04-24 | End: 2023-04-28 | Stop reason: ALTCHOICE

## 2023-04-23 RX ORDER — COLCHICINE 0.6 MG/1
1.2 CAPSULE ORAL
Status: COMPLETED | OUTPATIENT
Start: 2023-04-23 | End: 2023-04-23

## 2023-04-23 RX ORDER — IBUPROFEN 400 MG/1
800 TABLET ORAL ONCE
Status: COMPLETED | OUTPATIENT
Start: 2023-04-23 | End: 2023-04-23

## 2023-04-23 RX ORDER — COLCHICINE 0.6 MG/1
1.2 TABLET ORAL
Status: DISCONTINUED | OUTPATIENT
Start: 2023-04-23 | End: 2023-04-23

## 2023-04-23 RX ADMIN — COLCHICINE 1.2 MG: 0.6 CAPSULE ORAL at 11:15

## 2023-04-23 RX ADMIN — IBUPROFEN 800 MG: 400 TABLET, FILM COATED ORAL at 11:15

## 2023-04-23 ASSESSMENT — PAIN SCALES - GENERAL: PAINLEVEL_OUTOF10: 9

## 2023-04-23 ASSESSMENT — PAIN DESCRIPTION - LOCATION: LOCATION: FOOT

## 2023-04-23 ASSESSMENT — PAIN - FUNCTIONAL ASSESSMENT: PAIN_FUNCTIONAL_ASSESSMENT: 0-10

## 2023-04-23 ASSESSMENT — PAIN DESCRIPTION - ORIENTATION: ORIENTATION: LEFT

## 2023-04-24 RX ORDER — ATORVASTATIN CALCIUM 10 MG/1
TABLET, FILM COATED ORAL
Qty: 90 TABLET | Refills: 0 | Status: CANCELLED | OUTPATIENT
Start: 2023-04-24

## 2023-04-24 RX ORDER — LOSARTAN POTASSIUM 50 MG/1
TABLET ORAL
Qty: 90 TABLET | Refills: 0 | Status: SHIPPED | OUTPATIENT
Start: 2023-04-24 | End: 2023-04-28 | Stop reason: SDUPTHER

## 2023-04-28 ENCOUNTER — OFFICE VISIT (OUTPATIENT)
Facility: CLINIC | Age: 63
End: 2023-04-28
Payer: MEDICAID

## 2023-04-28 VITALS
WEIGHT: 246.6 LBS | SYSTOLIC BLOOD PRESSURE: 161 MMHG | BODY MASS INDEX: 35.3 KG/M2 | DIASTOLIC BLOOD PRESSURE: 84 MMHG | OXYGEN SATURATION: 95 % | HEART RATE: 71 BPM | TEMPERATURE: 97.9 F | RESPIRATION RATE: 18 BRPM | HEIGHT: 70 IN

## 2023-04-28 DIAGNOSIS — E78.2 MIXED HYPERLIPIDEMIA: ICD-10-CM

## 2023-04-28 DIAGNOSIS — M1A.9XX0 CHRONIC GOUT WITHOUT TOPHUS, UNSPECIFIED CAUSE, UNSPECIFIED SITE: ICD-10-CM

## 2023-04-28 DIAGNOSIS — H61.23 BILATERAL IMPACTED CERUMEN: Primary | ICD-10-CM

## 2023-04-28 DIAGNOSIS — R97.20 ELEVATED PSA: ICD-10-CM

## 2023-04-28 DIAGNOSIS — R73.03 PREDIABETES: ICD-10-CM

## 2023-04-28 DIAGNOSIS — I10 ESSENTIAL HYPERTENSION: ICD-10-CM

## 2023-04-28 PROCEDURE — 3078F DIAST BP <80 MM HG: CPT | Performed by: NURSE PRACTITIONER

## 2023-04-28 PROCEDURE — 3074F SYST BP LT 130 MM HG: CPT | Performed by: NURSE PRACTITIONER

## 2023-04-28 PROCEDURE — 99214 OFFICE O/P EST MOD 30 MIN: CPT | Performed by: NURSE PRACTITIONER

## 2023-04-28 RX ORDER — LOSARTAN POTASSIUM 50 MG/1
50 TABLET ORAL EVERY MORNING
Qty: 90 TABLET | Refills: 0 | Status: CANCELLED | OUTPATIENT
Start: 2023-04-28

## 2023-04-28 RX ORDER — COLCHICINE 0.6 MG/1
0.6 TABLET ORAL DAILY
Qty: 15 TABLET | Refills: 0 | Status: CANCELLED | OUTPATIENT
Start: 2023-04-28

## 2023-04-28 RX ORDER — ALLOPURINOL 100 MG/1
200 TABLET ORAL DAILY
Qty: 180 TABLET | Refills: 1 | Status: SHIPPED | OUTPATIENT
Start: 2023-04-28

## 2023-04-28 RX ORDER — LOSARTAN POTASSIUM 100 MG/1
100 TABLET ORAL EVERY MORNING
Qty: 90 TABLET | Refills: 3 | Status: SHIPPED | OUTPATIENT
Start: 2023-04-28

## 2023-04-28 SDOH — ECONOMIC STABILITY: FOOD INSECURITY: WITHIN THE PAST 12 MONTHS, THE FOOD YOU BOUGHT JUST DIDN'T LAST AND YOU DIDN'T HAVE MONEY TO GET MORE.: SOMETIMES TRUE

## 2023-04-28 SDOH — ECONOMIC STABILITY: FOOD INSECURITY: WITHIN THE PAST 12 MONTHS, YOU WORRIED THAT YOUR FOOD WOULD RUN OUT BEFORE YOU GOT MONEY TO BUY MORE.: SOMETIMES TRUE

## 2023-04-28 SDOH — ECONOMIC STABILITY: HOUSING INSECURITY
IN THE LAST 12 MONTHS, WAS THERE A TIME WHEN YOU DID NOT HAVE A STEADY PLACE TO SLEEP OR SLEPT IN A SHELTER (INCLUDING NOW)?: NO

## 2023-04-28 SDOH — ECONOMIC STABILITY: INCOME INSECURITY: HOW HARD IS IT FOR YOU TO PAY FOR THE VERY BASICS LIKE FOOD, HOUSING, MEDICAL CARE, AND HEATING?: SOMEWHAT HARD

## 2023-04-28 ASSESSMENT — PATIENT HEALTH QUESTIONNAIRE - PHQ9
SUM OF ALL RESPONSES TO PHQ QUESTIONS 1-9: 0
1. LITTLE INTEREST OR PLEASURE IN DOING THINGS: 0
SUM OF ALL RESPONSES TO PHQ QUESTIONS 1-9: 0
SUM OF ALL RESPONSES TO PHQ9 QUESTIONS 1 & 2: 0
2. FEELING DOWN, DEPRESSED OR HOPELESS: 0

## 2023-04-28 NOTE — PROGRESS NOTES
Fingerstick for HBA1C done in right hand first finger by Aixa Martinez MA per order of Tsaneem Klein NP after cleaning area with alcohol wipe. Patient tolerated procedure well.
Javier Perez presents today for   Chief Complaint   Patient presents with    Follow-up     6m follow up. Pt went to ER 4/23/23 for gout flare up. Is someone accompanying this pt? Yes, mom    Is the patient using any DME equipment during OV? no    Health Maintenance reviewed and discussed and ordered per Provider. Health Maintenance Due   Topic Date Due    COVID-19 Vaccine (1) Never done    Diabetic foot exam  Never done    HIV screen  Never done    Diabetic retinal exam  Never done    GFR test (Diabetes, CKD 3-4, OR last GFR 15-59)  Never done    DTaP/Tdap/Td vaccine (1 - Tdap) Never done    Shingles vaccine (1 of 2) Never done   . Coordination of Care:  1. \"Have you been to the ER, urgent care clinic since your last visit? Hospitalized since your last visit? \" Yes    2. \"Have you seen or consulted any other health care providers outside of the [address] [place] [address] since your last visit? \" No    3. For patients aged 39-70: Has the patient had a colonoscopy? Yes- No care gap present    If the patient is female:    4. For patients aged 41-77: Has the patient had a mammogram within the past 2 years? N/A based on age/sex    5. For patients aged 21-65: Has the patient had a pap smear?  N/A based on age/sex
Prescribed Allopurinol 100 mg tablet daily and uric acid level for future for management of chronic gout. Essential hypertension. Continue Losartan 100 mg tablet daily for management of essential hypertension. Prediabetes. Patient will continue dietary control of carbohydrate intake to manage his prediabetes. Elevated PSA. Patient will continue Finasteride 5 mg tablet daily for management of elevated PSA. Mixed hyperlipidemia. Continue Atorvastatin 10 mg tablet daily for management of mixed hyperlipidemia. I have discussed the diagnosis with the patient and the intended plan as seen in the above orders. The patient has received an after-visit summary and questions were answered concerning future plans. I have discussed medication side effects and warnings with the patient as well. I have reviewed the plan of care with the patient, accepted their input and they are in agreement with the treatment goals.        BRYAN Newton - NP  April 28, 2023

## 2023-04-30 PROBLEM — E11.9 TYPE 2 DIABETES MELLITUS WITHOUT COMPLICATIONS (HCC): Status: RESOLVED | Noted: 2019-09-19 | Resolved: 2023-04-30

## 2023-04-30 PROBLEM — E78.2 MIXED HYPERLIPIDEMIA: Status: ACTIVE | Noted: 2019-09-19

## 2023-05-08 ENCOUNTER — NURSE ONLY (OUTPATIENT)
Facility: CLINIC | Age: 63
End: 2023-05-08

## 2023-05-08 ENCOUNTER — HOSPITAL ENCOUNTER (OUTPATIENT)
Age: 63
Discharge: HOME | End: 2023-05-11

## 2023-05-08 ENCOUNTER — TELEPHONE (OUTPATIENT)
Facility: CLINIC | Age: 63
End: 2023-05-08

## 2023-05-08 VITALS — DIASTOLIC BLOOD PRESSURE: 76 MMHG | SYSTOLIC BLOOD PRESSURE: 128 MMHG

## 2023-05-08 DIAGNOSIS — I10 ESSENTIAL HYPERTENSION: Primary | ICD-10-CM

## 2023-05-08 DIAGNOSIS — M1A.9XX0 CHRONIC GOUT WITHOUT TOPHUS, UNSPECIFIED CAUSE, UNSPECIFIED SITE: Primary | ICD-10-CM

## 2023-05-08 LAB — LABCORP DRAW FEE: NORMAL

## 2023-05-08 PROCEDURE — 99001 SPECIMEN HANDLING PT-LAB: CPT

## 2023-05-08 NOTE — TELEPHONE ENCOUNTER
Estrellita Hashimoto called stating that allopurinol isn't working for the patient. His swelling has gone down tremendously but he is still hopping. Patient is coming in today for a nurse visit.      Nancy's call back number is [phone]

## 2023-05-08 NOTE — PROGRESS NOTES
Patient here for nurse visit to recheck blood pressure per order of Lili Brandon NP. Are you currently experiencing any of the following:  Dizziness: no  Blurry vision: no  Headache: no  Chest pain or discomfort: no  Speech difficulties: no    In the past 30-60 minutes have you:  Smoked: no  Drank caffeine or alcohol: no  Participated in any strenuous activity: no    Patient was seated for at least 5 minutes before blood pressure was read. Today's readings were relayed to Lili Brandon NP. Lili Brandon NP said to continue losartan as prescribed and follow up as scheduled. Relayed information to patient and they expressed understanding.

## 2023-05-09 ENCOUNTER — TELEPHONE (OUTPATIENT)
Facility: CLINIC | Age: 63
End: 2023-05-09

## 2023-05-09 DIAGNOSIS — M1A.9XX0 CHRONIC GOUT WITHOUT TOPHUS, UNSPECIFIED CAUSE, UNSPECIFIED SITE: ICD-10-CM

## 2023-05-09 LAB — URIC ACID: 3.7 MG/DL (ref 3.9–9)

## 2023-05-09 RX ORDER — COLCHICINE 0.6 MG/1
TABLET ORAL
Qty: 30 TABLET | Refills: 0 | Status: SHIPPED | OUTPATIENT
Start: 2023-05-09 | End: 2023-05-11

## 2023-05-09 NOTE — TELEPHONE ENCOUNTER
Patient's sister, Melania Tyler, on 5/9/2023 via phone call. Prescribed Colchicine 0.6 mg tablet, take 1 tablet every 2 hours until diarrhea and stop. Patient's sister reports he did not take his prescribed Allopurinol 200 mg daily today, because he thought it was causing increased foot pain. Advised patient to continue continue Allopurinol 100 mg tablet, take 2 tablets daily on 5/10/2023. Patient's sister understood had no further questions at this time.

## 2023-05-09 NOTE — TELEPHONE ENCOUNTER
Sara Paul from [address] [place] [address] called in regards to the patients prescription for colchicine she recommended sending in a new script for a lower dosage of about 0.3 for 3 times a day, because a high dosage can cause blood disorders. Please advise.

## 2023-05-11 RX ORDER — COLCHICINE 0.6 MG/1
TABLET ORAL
Qty: 6 TABLET | Refills: 0 | Status: SHIPPED | OUTPATIENT
Start: 2023-05-11

## 2023-05-11 NOTE — TELEPHONE ENCOUNTER
Reordered Colchicine 0.6 mg tablet, take 1/2 tablet every 2 hours until diarrhea occurs then stop on 5/11/2023 per [facility] Pharmacist advice.

## 2023-05-19 ENCOUNTER — TELEPHONE (OUTPATIENT)
Facility: CLINIC | Age: 63
End: 2023-05-19

## 2023-05-19 NOTE — TELEPHONE ENCOUNTER
Nalini Crane called wanting to know if the patient can take his colchicine with the rest of his medications. She believes he has stopped taking the medication. Please advise.      Nancy's call back number is [phone]

## 2023-07-21 ENCOUNTER — HOSPITAL ENCOUNTER (EMERGENCY)
Age: 63
Discharge: HOME | End: 2023-07-21
Attending: FAMILY MEDICINE
Payer: MEDICAID

## 2023-07-21 ENCOUNTER — APPOINTMENT (OUTPATIENT)
Age: 63
End: 2023-07-21
Payer: MEDICAID

## 2023-07-21 VITALS
OXYGEN SATURATION: 97 % | SYSTOLIC BLOOD PRESSURE: 150 MMHG | RESPIRATION RATE: 22 BRPM | DIASTOLIC BLOOD PRESSURE: 85 MMHG | HEART RATE: 120 BPM

## 2023-07-21 DIAGNOSIS — G89.29 CHRONIC MIDLINE THORACIC BACK PAIN: Primary | ICD-10-CM

## 2023-07-21 DIAGNOSIS — M54.6 CHRONIC MIDLINE THORACIC BACK PAIN: Primary | ICD-10-CM

## 2023-07-21 DIAGNOSIS — M54.50 CHRONIC MIDLINE LOW BACK PAIN, UNSPECIFIED WHETHER SCIATICA PRESENT: ICD-10-CM

## 2023-07-21 DIAGNOSIS — G89.29 CHRONIC MIDLINE LOW BACK PAIN, UNSPECIFIED WHETHER SCIATICA PRESENT: ICD-10-CM

## 2023-07-21 DIAGNOSIS — I77.810 AORTIC ECTASIA, THORACIC (HCC): ICD-10-CM

## 2023-07-21 PROCEDURE — 99284 EMERGENCY DEPT VISIT MOD MDM: CPT

## 2023-07-21 PROCEDURE — 72070 X-RAY EXAM THORAC SPINE 2VWS: CPT

## 2023-07-21 PROCEDURE — 96372 THER/PROPH/DIAG INJ SC/IM: CPT

## 2023-07-21 PROCEDURE — 72100 X-RAY EXAM L-S SPINE 2/3 VWS: CPT

## 2023-07-21 PROCEDURE — 6360000002 HC RX W HCPCS: Performed by: FAMILY MEDICINE

## 2023-07-21 RX ORDER — KETOROLAC TROMETHAMINE 30 MG/ML
30 INJECTION, SOLUTION INTRAMUSCULAR; INTRAVENOUS
Status: COMPLETED | OUTPATIENT
Start: 2023-07-21 | End: 2023-07-21

## 2023-07-21 RX ADMIN — KETOROLAC TROMETHAMINE 30 MG: 30 INJECTION, SOLUTION INTRAMUSCULAR; INTRAVENOUS at 12:36

## 2023-07-21 ASSESSMENT — ENCOUNTER SYMPTOMS: BACK PAIN: 1

## 2023-07-21 ASSESSMENT — LIFESTYLE VARIABLES
HOW OFTEN DO YOU HAVE A DRINK CONTAINING ALCOHOL: PATIENT DECLINED
HOW MANY STANDARD DRINKS CONTAINING ALCOHOL DO YOU HAVE ON A TYPICAL DAY: PATIENT DECLINED

## 2023-07-21 NOTE — ED PROVIDER NOTES
[facility] EMERGENCY DEPT  EMERGENCY DEPARTMENT ENCOUNTER      Pt Name: Jenna Garay  MRN: [id number]  [address] 1960  Date of evaluation: 7/21/2023  Provider: Linda Duran DO    CHIEF COMPLAINT     Upper back pain      HISTORY OF PRESENT ILLNESS   (Location/Symptom, Timing/Onset, Context/Setting, Quality, Duration, Modifying Factors, Severity)  Note limiting factors. Jenna Garay is a 66 y.o. male who presents to the emergency department patient comes in with a chief complaint of mid thoracic back pain. States has been lifting a lot of things heavy. It hurts when he moves. Has not taken anything for pain except for Tylenol. Patient does have learning disabilities and difficult to give me a true description of his back pain. He is in tears and rates his pain as a 2 out of 10. Mother, he is the patient. And states that he is somewhat challenged. He denies any numbness or tingling denies any problems urinating    HPI    Nursing Notes were reviewed. REVIEW OF SYSTEMS    (2-9 systems for level 4, 10 or more for level 5)     Review of Systems   Musculoskeletal:  Positive for back pain. Except as noted above the remainder of the review of systems was reviewed and negative.        PAST MEDICAL HISTORY     Past Medical History:   Diagnosis Date    Diabetes ([address])     Elevated PSA     Gout     Gout     High cholesterol     Hyperlipemia     Hypertension     Pulmonary disease          SURGICAL HISTORY       Past Surgical History:   Procedure Laterality Date    COLONOSCOPY N/A 5/28/2021    COLONOSCOPY (TIVA) performed by Alma Griffiths MD at [facility] ENDOSCOPY    OTHER SURGICAL HISTORY      cyst removed-foot         CURRENT MEDICATIONS       Previous Medications    ALBUTEROL SULFATE HFA (PROVENTIL;VENTOLIN;PROAIR) 108 (90 BASE) MCG/ACT INHALER    Inhale 2 puffs into the lungs every 6 hours as needed    ALLOPURINOL (ZYLOPRIM) 100 MG TABLET    Take 2 tablets by mouth daily    ATORVASTATIN (LIPITOR) 10 MG

## 2023-07-21 NOTE — ED TRIAGE NOTES
Patient is mentally challenged, states patient is complaining of lower back pain x 4 days. Denies urinary issues or concerns.  Denies injury      Mother states he take a gout pill and a fluid pill    Patient lives with his sister, mother is unsure or home medication

## 2023-07-21 NOTE — DISCHARGE INSTRUCTIONS
As we spoke my recommendations at this time is 2 regular strength ibuprofen tablets every 4 hours for pain control, as well as 2 regular strength 500 mg tablets of Tylenol every 6-8 hours for additional pain relief. Avoid any lifting at this point. Return to the emergency department if there is any numbness or tingling any problems with urinating or stooling. .  Follow-up with your primary care doctor for further studies of your aorta

## 2023-08-28 ENCOUNTER — OFFICE VISIT (OUTPATIENT)
Facility: CLINIC | Age: 63
End: 2023-08-28
Payer: MEDICAID

## 2023-08-28 VITALS
TEMPERATURE: 97.2 F | RESPIRATION RATE: 18 BRPM | WEIGHT: 238 LBS | HEART RATE: 91 BPM | SYSTOLIC BLOOD PRESSURE: 132 MMHG | OXYGEN SATURATION: 100 % | BODY MASS INDEX: 34.07 KG/M2 | DIASTOLIC BLOOD PRESSURE: 66 MMHG | HEIGHT: 70 IN

## 2023-08-28 DIAGNOSIS — Z12.5 SCREENING PSA (PROSTATE SPECIFIC ANTIGEN): ICD-10-CM

## 2023-08-28 DIAGNOSIS — E78.2 MIXED HYPERLIPIDEMIA: ICD-10-CM

## 2023-08-28 DIAGNOSIS — M1A.9XX0 CHRONIC GOUT WITHOUT TOPHUS, UNSPECIFIED CAUSE, UNSPECIFIED SITE: ICD-10-CM

## 2023-08-28 DIAGNOSIS — R68.89 OTHER GENERAL SYMPTOMS AND SIGNS: ICD-10-CM

## 2023-08-28 DIAGNOSIS — E11.9 TYPE 2 DIABETES MELLITUS WITHOUT COMPLICATION, WITHOUT LONG-TERM CURRENT USE OF INSULIN (HCC): ICD-10-CM

## 2023-08-28 DIAGNOSIS — I10 ESSENTIAL HYPERTENSION: Primary | ICD-10-CM

## 2023-08-28 DIAGNOSIS — I10 ESSENTIAL HYPERTENSION: ICD-10-CM

## 2023-08-28 DIAGNOSIS — I77.819 AORTIC ECTASIA (HCC): ICD-10-CM

## 2023-08-28 PROCEDURE — 99214 OFFICE O/P EST MOD 30 MIN: CPT | Performed by: NURSE PRACTITIONER

## 2023-08-28 PROCEDURE — 3078F DIAST BP <80 MM HG: CPT | Performed by: NURSE PRACTITIONER

## 2023-08-28 PROCEDURE — 3075F SYST BP GE 130 - 139MM HG: CPT | Performed by: NURSE PRACTITIONER

## 2023-08-28 NOTE — PROGRESS NOTES
Jenna Garay presents today for   Chief Complaint   Patient presents with    Follow-up     Pt reports he feels the medication for his feet isn't. Pt reports his back has been bothering him. Is someone accompanying this pt? Yes, mom    Is the patient using any DME equipment during OV? no    Health Maintenance reviewed and discussed and ordered per Provider. Health Maintenance Due   Topic Date Due    COVID-19 Vaccine (1) Never done    Diabetic foot exam  Never done    HIV screen  Never done    Diabetic Alb to Cr ratio (uACR) test  Never done    Diabetic retinal exam  Never done    GFR test (Diabetes, CKD 3-4, OR last GFR 15-59)  Never done    DTaP/Tdap/Td vaccine (1 - Tdap) Never done    Shingles vaccine (1 of 2) Never done    Flu vaccine (1) Never done    A1C test (Diabetic or Prediabetic)  08/03/2023    Lipids  08/16/2023   . Coordination of Care:  1. \"Have you been to the ER, urgent care clinic since your last visit? Hospitalized since your last visit? \" No    2. \"Have you seen or consulted any other health care providers outside of the [address] [place] [address] since your last visit? \" No    3. For patients aged 43-73: Has the patient had a colonoscopy? Yes- No care gap present    If the patient is female:    4. For patients aged 43-66: Has the patient had a mammogram within the past 2 years? N/A based on age/sex    5. For patients aged 21-65: Has the patient had a pap smear?  N/A based on age/sex
Weight: 238 lb (108 kg)    Height: 5' 10\" (1.778 m)        Laboratory/Tests:  No visits with results within 3 Month(s) from this visit. Latest known visit with results is:   Hospital Outpatient Visit on 05/08/2023   Component Date Value Ref Range Status    [facility] Draw Fee 05/08/2023 Specimens collected/sent to [facility].    Final     Patient reported to examination room with his mother, Loretta Lora. Patient's mother and sister are his caregivers due to patient's intellectual disability. Patient lives with his sister due to sister's history of CVA which requires additional assistance with ADLs. Patient reported to ED on 7/21/2023 with chief complaint of low back pain with 4-day history. Patient was diagnosed with osteoarthritis and discharged same day. Patient reports he may still experience some mild to moderate lumbar back pain. Patient's mother educated to use OTC Ibuprofen 600 mg every 8 hours as needed for lumbar back pain. Thoracic x-ray on 7/21/2023 Impression:   Mild to moderate thoracic aortic ectasia with mild tortuosity is noted which is progressed compared with chest radiographs of 2019. [id number] [facility] at this visit. Physical examination performed:  Bilateral ear tympanic membrane visualized during otoscopic examination revealed no abnormalities. Cardiac auscultation revealed no arrhythmias or murmurs. Bilateral lung sounds clear to auscultation in all fields. Abdomen soft and nontender, bowel sounds normoactive in all 4 quadrants. There is no observed bilateral lower extremity edema. Contact patient's mother if laboratory results require change in treatment. Assessment/Plan:    Essential hypertension. Continue Losartan 100 mg tablet every morning for management of essential hypertension. Chronic gout. Continue Allopurinol 100 mg tablet, take 2 tablets daily for management of chronic gout. Aortic ectasia ([address]).   Patient referred to [id number]

## 2023-09-27 ENCOUNTER — HOSPITAL ENCOUNTER (OUTPATIENT)
Age: 63
Discharge: HOME | End: 2023-09-30

## 2023-09-27 LAB — SENTARA SPECIMEN COLLECTION: NORMAL

## 2023-09-28 LAB
A/G RATIO: 1.2 RATIO (ref 1.1–2.6)
ALBUMIN SERPL-MCNC: 3.6 G/DL (ref 3.5–5)
ALP BLD-CCNC: 180 U/L (ref 40–125)
ALT SERPL-CCNC: 6 U/L (ref 5–40)
ANION GAP SERPL CALCULATED.3IONS-SCNC: 14 MMOL/L (ref 3–15)
ANISOCYTOSIS: ABNORMAL
AST SERPL-CCNC: 15 U/L (ref 10–37)
AVERAGE GLUCOSE: 150 MG/DL (ref 91–123)
BANDS: 8 % (ref 0–5)
BASOPHILS ABSOLUTE: 0.2 K/UL (ref 0–0.2)
BASOPHILS C MAN (DIFF): 3 % (ref 0–2)
BILIRUB SERPL-MCNC: 0.5 MG/DL (ref 0.2–1.2)
BUN BLDV-MCNC: 11 MG/DL (ref 6–22)
CALCIUM SERPL-MCNC: 8.5 MG/DL (ref 8.4–10.5)
CHLORIDE BLD-SCNC: 97 MMOL/L (ref 98–110)
CHOLESTEROL/HDL RATIO: 2.7 (ref 0–5)
CHOLESTEROL: 149 MG/DL (ref 110–200)
CO2: 26 MMOL/L (ref 20–32)
CREAT SERPL-MCNC: 0.5 MG/DL (ref 0.8–1.6)
CREATININE URINE: 173 MG/DL
GLOBULIN: 2.9 G/DL (ref 2–4)
GLOMERULAR FILTRATION RATE: >60 ML/MIN/1.73 SQ.M.
GLUCOSE: 150 MG/DL (ref 70–99)
HBA1C MFR BLD: 6.9 % (ref 4.8–5.6)
HCT VFR BLD CALC: 23.4 % (ref 39.3–51.6)
HDLC SERPL-MCNC: 55 MG/DL
HEMOGLOBIN: 7 G/DL (ref 13.1–17.2)
HYPOCHROMIA: ABNORMAL
LDL CHOLESTEROL CALCULATED: 82 MG/DL (ref 50–99)
LDL/HDL RATIO: 1.5
LYMPHOCYTES C MAN (DIFF): 24 % (ref 20–45)
LYMPHS ABS-DIF: 1.6 K/UL (ref 1–4.8)
Lab: 1 %
MCH RBC QN AUTO: 28 PG (ref 26–34)
MCHC RBC AUTO-ENTMCNC: 30 G/DL (ref 31–36)
MCV RBC AUTO: 94 FL (ref 80–95)
METAMYELOCYTES ABSOLUTE COUNT: 0.1 K/UL
MICROALB/CREAT RATIO (UG/MG CREAT.): 28.3 (ref 0–30)
MICROALBUMIN/CREAT 24H UR: 48.9 MG/L (ref 0.1–17)
MONOCYTES ABS-DIF: 1.6 K/UL (ref 0.1–1)
MONOCYTES C MAN (DIFF): 24 % (ref 3–12)
NEUTROPHILS ABSOLUTE: 3.1 K/UL (ref 1.8–7.7)
NEUTROPHILS C MAN (DIFF): 40 % (ref 40–75)
NON-HDL CHOLESTEROL: 94 MG/DL
NORMOCYTIC CELLAVISION: ABNORMAL
NUCLEATED RED BLOOD CELLS: 7 /100WC
PDW BLD-RTO: 16.6 % (ref 10–15.5)
PLATELET # BLD: 202 K/UL (ref 140–440)
POTASSIUM SERPL-SCNC: 3.7 MMOL/L (ref 3.5–5.5)
PROSTATE SPECIFIC ANTIGEN: 438 NG/ML
RBC: 2.5 M/UL (ref 3.8–5.8)
SMEAR REVIEW: ABNORMAL
SODIUM BLD-SCNC: 137 MMOL/L (ref 133–145)
TARGET CELLS: ABNORMAL
TEAR DROP CELLS: ABNORMAL
TOTAL PROTEIN: 6.5 G/DL (ref 6.2–8.1)
TRIGL SERPL-MCNC: 57 MG/DL (ref 40–149)
TSH SERPL DL<=0.05 MIU/L-ACNC: 3.91 MCU/ML (ref 0.27–4.2)
URIC ACID: 4.5 MG/DL (ref 3.9–9)
VITAMIN B-12: 367 PG/ML (ref 211–911)
VLDLC SERPL CALC-MCNC: 11 MG/DL (ref 8–30)
WBC: 6.6 K/UL (ref 4–11)

## 2023-09-29 ENCOUNTER — TELEPHONE (OUTPATIENT)
Facility: CLINIC | Age: 63
End: 2023-09-29

## 2023-09-29 DIAGNOSIS — D64.9 LOW HEMOGLOBIN: ICD-10-CM

## 2023-09-29 DIAGNOSIS — R97.20 ELEVATED PSA, GREATER THAN OR EQUAL TO 20 NG/ML: Primary | ICD-10-CM

## 2023-09-29 NOTE — TELEPHONE ENCOUNTER
Spoke with patient, patient's mother, and patient's sister on 9/29/2023. Patient's blood pressure on 9/28/2023 = 146/78. Patient blood pressure on 9/29/2023 = 118/62. Patient denies dizziness or unsteadiness. RBC = 2.50, Hemoglobin = 7.0 & Hematocrit = 23.4. Patient  denies david blood in stool or black in the stools. Will order repeat CBC for patient to have resulted before appointment on 10/3/2023. PSA = 49.6 on 8/16/2022 and patient was referred to [facility]. Patient was evaluated by [facility] on 9/20/2022 and digital rectal exam was unable to be performed due to rectal stricture. Patient was advised to follow-up for rectal exam with anesthesia at that visit. Patient did not follow-up, because he said he was not contacted by urology. PSA = 438 on 9/27/2023. Patient denies lower urinary tract symptoms. Will refer patient to Urology at this time. Patient's mother, patient and patient's sister advised of patient becomes short of breath to report to ED immediately. We will contact patient on Monday, 10/2/2023 to evaluate patient's condition. Patient understood had no further questions at this time.

## 2023-09-29 NOTE — TELEPHONE ENCOUNTER
Ayaka Adorno called on behalf of the patient stating he has been having shortness of breath, states it started this week. She thinks it may be from his allopurinol or losartan. Pt is scheduled to be seen in office on 10/3/2023.

## 2023-10-02 DIAGNOSIS — R97.20 ELEVATED PSA, GREATER THAN OR EQUAL TO 20 NG/ML: Primary | ICD-10-CM

## 2023-10-03 ENCOUNTER — OFFICE VISIT (OUTPATIENT)
Facility: CLINIC | Age: 63
End: 2023-10-03
Payer: MEDICAID

## 2023-10-03 ENCOUNTER — HOSPITAL ENCOUNTER (OUTPATIENT)
Age: 63
Discharge: HOME | End: 2023-10-06

## 2023-10-03 VITALS
SYSTOLIC BLOOD PRESSURE: 123 MMHG | TEMPERATURE: 97.6 F | RESPIRATION RATE: 18 BRPM | HEART RATE: 92 BPM | HEIGHT: 70 IN | BODY MASS INDEX: 34.16 KG/M2 | WEIGHT: 238.6 LBS | DIASTOLIC BLOOD PRESSURE: 65 MMHG | OXYGEN SATURATION: 99 %

## 2023-10-03 DIAGNOSIS — R97.20 ELEVATED PSA: ICD-10-CM

## 2023-10-03 DIAGNOSIS — Z12.5 ENCOUNTER FOR SCREENING FOR MALIGNANT NEOPLASM OF PROSTATE: ICD-10-CM

## 2023-10-03 DIAGNOSIS — R60.0 BILATERAL LEG EDEMA: ICD-10-CM

## 2023-10-03 DIAGNOSIS — D64.9 LOW HEMOGLOBIN: Primary | ICD-10-CM

## 2023-10-03 LAB — SENTARA SPECIMEN COLLECTION: NORMAL

## 2023-10-03 PROCEDURE — 3078F DIAST BP <80 MM HG: CPT | Performed by: NURSE PRACTITIONER

## 2023-10-03 PROCEDURE — 99214 OFFICE O/P EST MOD 30 MIN: CPT | Performed by: NURSE PRACTITIONER

## 2023-10-03 PROCEDURE — 3074F SYST BP LT 130 MM HG: CPT | Performed by: NURSE PRACTITIONER

## 2023-10-03 RX ORDER — FUROSEMIDE 20 MG/1
20 TABLET ORAL DAILY PRN
Qty: 90 TABLET | Refills: 1 | Status: SHIPPED | OUTPATIENT
Start: 2023-10-03

## 2023-10-03 NOTE — PROGRESS NOTES
History of Present Illness  Loreta Mello is a 66 y.o. male who presents today for:    Chief Complaint   Patient presents with    Shortness of Breath     Pt reports some back pain, SOB that comes and goes depending on what his doing. Pt left foot is swollen. Past Medical History  Past Medical History:   Diagnosis Date    Diabetes ([address])     Elevated PSA     Gout     Gout     High cholesterol     Hyperlipemia     Hypertension     Pulmonary disease         Surgical History  Past Surgical History:   Procedure Laterality Date    COLONOSCOPY N/A 5/28/2021    COLONOSCOPY (TIVA) performed by Raquel Martinez MD at [facility] ENDOSCOPY    OTHER SURGICAL HISTORY      cyst removed-foot        Current Medications  Current Outpatient Medications   Medication Sig    Acetaminophen (TYLENOL ARTHRITIS PAIN PO) Take by mouth    colchicine (COLCRYS) 0.6 MG tablet Take 1/2 tablet every 2 hours until diarrhea occurs and stop.     allopurinol (ZYLOPRIM) 100 MG tablet Take 2 tablets by mouth daily    losartan (COZAAR) 100 MG tablet Take 1 tablet by mouth every morning    finasteride (PROSCAR) 5 MG tablet Take 1 tablet by mouth once daily (Patient not taking: Reported on 5/8/2023)    albuterol sulfate HFA (PROVENTIL;VENTOLIN;PROAIR) 108 (90 Base) MCG/ACT inhaler Inhale 2 puffs into the lungs every 6 hours as needed (Patient not taking: Reported on 4/23/2023)    atorvastatin (LIPITOR) 10 MG tablet TAKE 1 TABLET BY MOUTH ONCE DAILY FOR HIGH CHOLESTEROL (Patient not taking: Reported on 5/8/2023)    carbamide peroxide (DEBROX) 6.5 % otic solution Place 5 drops in ear(s) 2 times daily (Patient not taking: Reported on 5/8/2023)    ergocalciferol (ERGOCALCIFEROL) 1.25 MG (51980 UT) capsule Take 1 capsule by mouth every 7 days (Patient not taking: Reported on 10/3/2023)    montelukast (SINGULAIR) 10 MG tablet Take 1 tablet by mouth daily (Patient not taking: Reported on 8/28/2023)     No current facility-administered medications for this

## 2023-10-03 NOTE — PROGRESS NOTES
Loreta Mello presents today for   Chief Complaint   Patient presents with    Shortness of Breath     Pt reports some back pain, SOB that comes and goes. Pt left foot is swollen. Is someone accompanying this pt? Yes, mom    Is the patient using any DME equipment during OV? no    Health Maintenance reviewed and discussed and ordered per Provider. Health Maintenance Due   Topic Date Due    COVID-19 Vaccine (1) Never done    Diabetic foot exam  Never done    HIV screen  Never done    Diabetic retinal exam  Never done    DTaP/Tdap/Td vaccine (1 - Tdap) Never done    Shingles vaccine (1 of 2) Never done    Pneumococcal 0-64 years Vaccine (2 - PCV) 09/19/2020    Hepatitis B vaccine (1 of 3 - Risk 3-dose series) Never done    Flu vaccine (1) Never done   . Coordination of Care:  1. \"Have you been to the ER, urgent care clinic since your last visit? Hospitalized since your last visit? \" No    2. \"Have you seen or consulted any other health care providers outside of the [address] [place] [address] since your last visit? \" No    3. For patients aged 43-73: Has the patient had a colonoscopy? Yes- No care gap present    If the patient is female:    4. For patients aged 43-66: Has the patient had a mammogram within the past 2 years? N/A based on age/sex    5. For patients aged 21-65: Has the patient had a pap smear?  N/A based on age/sex

## 2023-10-04 LAB
BASOPHILS ABSOLUTE: 0.3 K/UL (ref 0–0.2)
BASOPHILS C MAN (DIFF): 4 % (ref 0–2)
EOS ABS-DIF: 0.2 K/UL (ref 0–0.5)
EOSINOPHILS C MAN (DIFF): 3 % (ref 0–6)
HCT VFR BLD CALC: 21.5 % (ref 39.3–51.6)
HEMOGLOBIN: 6.4 G/DL (ref 13.1–17.2)
HYPOCHROMIA: ABNORMAL
LYMPHOCYTES C MAN (DIFF): 40 % (ref 20–45)
LYMPHS ABS-DIF: 2.9 K/UL (ref 1–4.8)
Lab: 1 %
MCH RBC QN AUTO: 28 PG (ref 26–34)
MCHC RBC AUTO-ENTMCNC: 30 G/DL (ref 31–36)
MCV RBC AUTO: 95 FL (ref 80–95)
METAMYELOCYTES ABSOLUTE COUNT: 0.1 K/UL
MONOCYTES ABS-DIF: 0.6 K/UL (ref 0.1–1)
MONOCYTES C MAN (DIFF): 8 % (ref 3–12)
NEUTROPHILS ABSOLUTE: 3.1 K/UL (ref 1.8–7.7)
NEUTROPHILS C MAN (DIFF): 43 % (ref 40–75)
NORMOCYTIC CELLAVISION: ABNORMAL
NUCLEATED RED BLOOD CELLS: 9 /100WC
PDW BLD-RTO: 16.6 % (ref 10–15.5)
PLATELET # BLD: 202 K/UL (ref 140–440)
PMV BLD AUTO: 10 FL (ref 9–13)
PROSTATE SPECIFIC ANTIGEN: 439 NG/ML
RBC: 2.27 M/UL (ref 3.8–5.8)
SMEAR REVIEW: ABNORMAL
WBC: 7.2 K/UL (ref 4–11)

## 2023-10-09 ENCOUNTER — TELEPHONE (OUTPATIENT)
Age: 63
End: 2023-10-09

## 2023-10-09 NOTE — TELEPHONE ENCOUNTER
Caitie Whalen from [facility] called stating that the pt has a PSA of 439    *CALL PT TO SCHEDULE APPT*

## 2023-10-13 ENCOUNTER — APPOINTMENT (OUTPATIENT)
Age: 63
DRG: 663 | End: 2023-10-13
Attending: EMERGENCY MEDICINE
Payer: MEDICAID

## 2023-10-13 ENCOUNTER — APPOINTMENT (OUTPATIENT)
Age: 63
DRG: 663 | End: 2023-10-13
Payer: MEDICAID

## 2023-10-13 ENCOUNTER — HOSPITAL ENCOUNTER (INPATIENT)
Age: 63
LOS: 1 days | Discharge: HOME | DRG: 663 | End: 2023-10-14
Attending: EMERGENCY MEDICINE | Admitting: INTERNAL MEDICINE
Payer: MEDICAID

## 2023-10-13 ENCOUNTER — TELEPHONE (OUTPATIENT)
Facility: CLINIC | Age: 63
End: 2023-10-13

## 2023-10-13 DIAGNOSIS — J90 PLEURAL EFFUSION, BILATERAL: ICD-10-CM

## 2023-10-13 DIAGNOSIS — R06.09 DYSPNEA ON EXERTION: Primary | ICD-10-CM

## 2023-10-13 DIAGNOSIS — C79.51 METASTASIS TO BONE (HCC): ICD-10-CM

## 2023-10-13 DIAGNOSIS — D64.9 SYMPTOMATIC ANEMIA: ICD-10-CM

## 2023-10-13 LAB
ALBUMIN SERPL-MCNC: 2.7 G/DL (ref 3.4–5)
ALBUMIN/GLOB SERPL: 0.6 (ref 0.8–1.7)
ALP SERPL-CCNC: 205 U/L (ref 45–117)
ALT SERPL-CCNC: 12 U/L (ref 16–61)
ANION GAP SERPL CALC-SCNC: 9 MMOL/L (ref 3–18)
APPEARANCE UR: CLEAR
AST SERPL W P-5'-P-CCNC: 14 U/L (ref 10–38)
BACTERIA URNS QL MICRO: NEGATIVE /HPF
BASOPHILS # BLD: 0 K/UL (ref 0–0.1)
BASOPHILS NFR BLD: 0 % (ref 0–2)
BILIRUB SERPL-MCNC: 0.5 MG/DL (ref 0.2–1)
BILIRUB UR QL: NEGATIVE
BNP SERPL-MCNC: 872 PG/ML (ref 0–900)
BUN SERPL-MCNC: 12 MG/DL (ref 7–18)
BUN/CREAT SERPL: 16 (ref 12–20)
CA-I BLD-MCNC: 8.4 MG/DL (ref 8.5–10.1)
CHLORIDE SERPL-SCNC: 97 MMOL/L (ref 100–111)
CO2 SERPL-SCNC: 28 MMOL/L (ref 21–32)
COLOR UR: YELLOW
CREAT SERPL-MCNC: 0.73 MG/DL (ref 0.6–1.3)
DIFFERENTIAL METHOD BLD: ABNORMAL
ECHO BSA: 2.3 M2
EOSINOPHIL # BLD: 0 K/UL (ref 0–0.4)
EOSINOPHIL NFR BLD: 0 % (ref 0–5)
EPITH CASTS URNS QL MICRO: ABNORMAL /LPF (ref 0–20)
ERYTHROCYTE [DISTWIDTH] IN BLOOD BY AUTOMATED COUNT: 18.1 % (ref 11.6–14.5)
GLOBULIN SER CALC-MCNC: 4.9 G/DL (ref 2–4)
GLUCOSE SERPL-MCNC: 139 MG/DL (ref 74–99)
GLUCOSE UR STRIP.AUTO-MCNC: NEGATIVE MG/DL
HCT VFR BLD AUTO: 21.1 % (ref 36–48)
HCT VFR BLD AUTO: 22.3 % (ref 36–48)
HGB BLD-MCNC: 6.4 G/DL (ref 13–16)
HGB BLD-MCNC: 6.8 G/DL (ref 13–16)
HGB UR QL STRIP: NEGATIVE
IMM GRANULOCYTES # BLD AUTO: 0 K/UL
IMM GRANULOCYTES NFR BLD AUTO: 0 %
KETONES UR QL STRIP.AUTO: NEGATIVE MG/DL
LACTATE SERPL-SCNC: 1.6 MMOL/L (ref 0.4–2)
LEUKOCYTE ESTERASE UR QL STRIP.AUTO: NEGATIVE
LIPASE SERPL-CCNC: 83 U/L (ref 73–393)
LYMPHOCYTES # BLD: 1.6 K/UL (ref 0.9–3.6)
LYMPHOCYTES NFR BLD: 32 % (ref 21–52)
MCH RBC QN AUTO: 27.8 PG (ref 24–34)
MCH RBC QN AUTO: 28.2 PG (ref 24–34)
MCHC RBC AUTO-ENTMCNC: 30.3 G/DL (ref 31–37)
MCHC RBC AUTO-ENTMCNC: 30.5 G/DL (ref 31–37)
MCV RBC AUTO: 93 FL (ref 78–100)
MONOCYTES # BLD: 0.8 K/UL (ref 0.05–1.2)
MONOCYTES NFR BLD: 17 % (ref 3–10)
NEUTS SEG # BLD: 2.5 K/UL (ref 1.8–8)
NEUTS SEG NFR BLD: 51 % (ref 40–73)
NITRITE UR QL STRIP.AUTO: NEGATIVE
NRBC # BLD: 0.13 K/UL (ref 0–0.01)
NRBC BLD-RTO: 2.6 PER 100 WBC
PH UR STRIP: 6 (ref 5–8)
PLATELET # BLD AUTO: 155 K/UL (ref 135–420)
PMV BLD AUTO: 10 FL (ref 9.2–11.8)
POTASSIUM SERPL-SCNC: 3.5 MMOL/L (ref 3.5–5.5)
PROT SERPL-MCNC: 7.6 G/DL (ref 6.4–8.2)
PROT UR STRIP-MCNC: NEGATIVE MG/DL
RBC # BLD AUTO: 2.27 M/UL (ref 4.35–5.65)
RBC #/AREA URNS HPF: ABNORMAL /HPF (ref 0–2)
RBC MORPH BLD: ABNORMAL
RBC MORPH BLD: ABNORMAL
SODIUM SERPL-SCNC: 134 MMOL/L (ref 136–145)
SP GR UR REFRACTOMETRY: 1.01 (ref 1–1.03)
TROPONIN I SERPL HS-MCNC: 6 NG/L (ref 0–78)
TROPONIN I SERPL HS-MCNC: 9 NG/L (ref 0–78)
URINE CULTURE IF INDICATED: ABNORMAL
UROBILINOGEN UR QL STRIP.AUTO: 0.2 EU/DL (ref 0.2–1)
WBC # BLD AUTO: 4.9 K/UL (ref 4.6–13.2)
WBC URNS QL MICRO: ABNORMAL /HPF (ref 0–4)

## 2023-10-13 PROCEDURE — 83605 ASSAY OF LACTIC ACID: CPT

## 2023-10-13 PROCEDURE — 85025 COMPLETE CBC W/AUTO DIFF WBC: CPT

## 2023-10-13 PROCEDURE — P9016 RBC LEUKOCYTES REDUCED: HCPCS

## 2023-10-13 PROCEDURE — 6360000002 HC RX W HCPCS: Performed by: NURSE PRACTITIONER

## 2023-10-13 PROCEDURE — 6360000004 HC RX CONTRAST MEDICATION: Performed by: EMERGENCY MEDICINE

## 2023-10-13 PROCEDURE — 85018 HEMOGLOBIN: CPT

## 2023-10-13 PROCEDURE — 2580000003 HC RX 258: Performed by: NURSE PRACTITIONER

## 2023-10-13 PROCEDURE — 93971 EXTREMITY STUDY: CPT

## 2023-10-13 PROCEDURE — 82607 VITAMIN B-12: CPT

## 2023-10-13 PROCEDURE — 71045 X-RAY EXAM CHEST 1 VIEW: CPT

## 2023-10-13 PROCEDURE — 84484 ASSAY OF TROPONIN QUANT: CPT

## 2023-10-13 PROCEDURE — 2580000003 HC RX 258: Performed by: EMERGENCY MEDICINE

## 2023-10-13 PROCEDURE — 83540 ASSAY OF IRON: CPT

## 2023-10-13 PROCEDURE — 6360000002 HC RX W HCPCS: Performed by: EMERGENCY MEDICINE

## 2023-10-13 PROCEDURE — 36430 TRANSFUSION BLD/BLD COMPNT: CPT

## 2023-10-13 PROCEDURE — 93005 ELECTROCARDIOGRAM TRACING: CPT | Performed by: EMERGENCY MEDICINE

## 2023-10-13 PROCEDURE — 82728 ASSAY OF FERRITIN: CPT

## 2023-10-13 PROCEDURE — 71275 CT ANGIOGRAPHY CHEST: CPT

## 2023-10-13 PROCEDURE — 80053 COMPREHEN METABOLIC PANEL: CPT

## 2023-10-13 PROCEDURE — 86901 BLOOD TYPING SEROLOGIC RH(D): CPT

## 2023-10-13 PROCEDURE — 6370000000 HC RX 637 (ALT 250 FOR IP): Performed by: NURSE PRACTITIONER

## 2023-10-13 PROCEDURE — 85014 HEMATOCRIT: CPT

## 2023-10-13 PROCEDURE — 86850 RBC ANTIBODY SCREEN: CPT

## 2023-10-13 PROCEDURE — 81001 URINALYSIS AUTO W/SCOPE: CPT

## 2023-10-13 PROCEDURE — 86900 BLOOD TYPING SEROLOGIC ABO: CPT

## 2023-10-13 PROCEDURE — 83690 ASSAY OF LIPASE: CPT

## 2023-10-13 PROCEDURE — 1100000000 HC RM PRIVATE

## 2023-10-13 PROCEDURE — 74177 CT ABD & PELVIS W/CONTRAST: CPT

## 2023-10-13 PROCEDURE — 30233N1 TRANSFUSION OF NONAUTOLOGOUS RED BLOOD CELLS INTO PERIPHERAL VEIN, PERCUTANEOUS APPROACH: ICD-10-PCS | Performed by: INTERNAL MEDICINE

## 2023-10-13 PROCEDURE — 82746 ASSAY OF FOLIC ACID SERUM: CPT

## 2023-10-13 PROCEDURE — 83880 ASSAY OF NATRIURETIC PEPTIDE: CPT

## 2023-10-13 PROCEDURE — 99285 EMERGENCY DEPT VISIT HI MDM: CPT

## 2023-10-13 RX ORDER — HYDROCODONE BITARTRATE AND ACETAMINOPHEN 5; 325 MG/1; MG/1
1 TABLET ORAL EVERY 4 HOURS PRN
Status: DISCONTINUED | OUTPATIENT
Start: 2023-10-13 | End: 2023-10-14 | Stop reason: HOSPADM

## 2023-10-13 RX ORDER — SODIUM CHLORIDE 9 MG/ML
INJECTION, SOLUTION INTRAVENOUS PRN
Status: COMPLETED | OUTPATIENT
Start: 2023-10-13 | End: 2023-10-13

## 2023-10-13 RX ORDER — SODIUM CHLORIDE, SODIUM LACTATE, POTASSIUM CHLORIDE, AND CALCIUM CHLORIDE .6; .31; .03; .02 G/100ML; G/100ML; G/100ML; G/100ML
1000 INJECTION, SOLUTION INTRAVENOUS ONCE
Status: COMPLETED | OUTPATIENT
Start: 2023-10-13 | End: 2023-10-13

## 2023-10-13 RX ORDER — ACETAMINOPHEN 325 MG/1
650 TABLET ORAL EVERY 4 HOURS PRN
Status: DISCONTINUED | OUTPATIENT
Start: 2023-10-13 | End: 2023-10-14 | Stop reason: HOSPADM

## 2023-10-13 RX ORDER — FUROSEMIDE 20 MG/1
20 TABLET ORAL DAILY
Status: DISCONTINUED | OUTPATIENT
Start: 2023-10-14 | End: 2023-10-14 | Stop reason: HOSPADM

## 2023-10-13 RX ORDER — FUROSEMIDE 10 MG/ML
40 INJECTION INTRAMUSCULAR; INTRAVENOUS ONCE
Status: COMPLETED | OUTPATIENT
Start: 2023-10-13 | End: 2023-10-13

## 2023-10-13 RX ORDER — FUROSEMIDE 20 MG/1
20 TABLET ORAL DAILY
Status: DISCONTINUED | OUTPATIENT
Start: 2023-10-13 | End: 2023-10-13

## 2023-10-13 RX ORDER — KETOROLAC TROMETHAMINE 30 MG/ML
15 INJECTION, SOLUTION INTRAMUSCULAR; INTRAVENOUS ONCE
Status: COMPLETED | OUTPATIENT
Start: 2023-10-13 | End: 2023-10-13

## 2023-10-13 RX ADMIN — HYDROCODONE BITARTRATE AND ACETAMINOPHEN 1 TABLET: 5; 325 TABLET ORAL at 16:49

## 2023-10-13 RX ADMIN — HYDROCODONE BITARTRATE AND ACETAMINOPHEN 1 TABLET: 5; 325 TABLET ORAL at 23:13

## 2023-10-13 RX ADMIN — SODIUM CHLORIDE, POTASSIUM CHLORIDE, SODIUM LACTATE AND CALCIUM CHLORIDE 1000 ML: 600; 310; 30; 20 INJECTION, SOLUTION INTRAVENOUS at 14:24

## 2023-10-13 RX ADMIN — FUROSEMIDE 40 MG: 10 INJECTION, SOLUTION INTRAMUSCULAR; INTRAVENOUS at 21:21

## 2023-10-13 RX ADMIN — KETOROLAC TROMETHAMINE 15 MG: 30 INJECTION, SOLUTION INTRAMUSCULAR at 14:23

## 2023-10-13 RX ADMIN — SODIUM CHLORIDE: 9 INJECTION, SOLUTION INTRAVENOUS at 16:10

## 2023-10-13 RX ADMIN — IOPAMIDOL 100 ML: 755 INJECTION, SOLUTION INTRAVENOUS at 12:15

## 2023-10-13 ASSESSMENT — PAIN SCALES - GENERAL
PAINLEVEL_OUTOF10: 0
PAINLEVEL_OUTOF10: 0
PAINLEVEL_OUTOF10: 5
PAINLEVEL_OUTOF10: 6
PAINLEVEL_OUTOF10: 3
PAINLEVEL_OUTOF10: 0

## 2023-10-13 ASSESSMENT — PAIN DESCRIPTION - ORIENTATION: ORIENTATION: RIGHT

## 2023-10-13 ASSESSMENT — PAIN DESCRIPTION - DESCRIPTORS
DESCRIPTORS: ACHING
DESCRIPTORS: ACHING

## 2023-10-13 ASSESSMENT — LIFESTYLE VARIABLES
HOW MANY STANDARD DRINKS CONTAINING ALCOHOL DO YOU HAVE ON A TYPICAL DAY: 1 OR 2
HOW OFTEN DO YOU HAVE A DRINK CONTAINING ALCOHOL: 2-3 TIMES A WEEK

## 2023-10-13 ASSESSMENT — PAIN DESCRIPTION - LOCATION
LOCATION: BACK

## 2023-10-13 ASSESSMENT — PAIN - FUNCTIONAL ASSESSMENT
PAIN_FUNCTIONAL_ASSESSMENT: ACTIVITIES ARE NOT PREVENTED
PAIN_FUNCTIONAL_ASSESSMENT: NONE - DENIES PAIN

## 2023-10-13 ASSESSMENT — PAIN DESCRIPTION - PAIN TYPE: TYPE: ACUTE PAIN

## 2023-10-13 NOTE — ED NOTES
TRANSFER - OUT REPORT:    Verbal report given to Mabaya on Nga Charles  being transferred to [address] [place] [address]  for routine progression of patient care       Report consisted of patient's Situation, Background, Assessment and   Recommendations(SBAR). Information from the following report(s) Nurse Handoff Report was reviewed with the receiving nurse. Burkett Fall Assessment:    Presents to emergency department  because of falls (Syncope, seizure, or loss of consciousness): No  Age > 70: No  Altered Mental Status, Intoxication with alcohol or substance confusion (Disorientation, impaired judgment, poor safety awaremess, or inability to follow instructions): No  Impaired Mobility: Ambulates or transfers with assistive devices or assistance; Unable to ambulate or transer.: No             Lines:   Peripheral IV 10/13/23 Right Antecubital (Active)   Site Assessment Clean, dry & intact 10/13/23 1118   Line Status Blood return noted 10/13/23 1118        Opportunity for questions and clarification was provided.       Patient transported with:  Marleni Lee, ALANAN  12/88/40 5248

## 2023-10-13 NOTE — TELEPHONE ENCOUNTER
Patients mom came in, Patient was admitted to the hospital. She says he needs an appointment for oncology.  Please Advise [phone]

## 2023-10-14 VITALS
HEART RATE: 74 BPM | SYSTOLIC BLOOD PRESSURE: 134 MMHG | OXYGEN SATURATION: 100 % | HEIGHT: 70 IN | BODY MASS INDEX: 33.79 KG/M2 | DIASTOLIC BLOOD PRESSURE: 78 MMHG | TEMPERATURE: 97 F | RESPIRATION RATE: 16 BRPM | WEIGHT: 236 LBS

## 2023-10-14 LAB
ABO + RH BLD: NORMAL
ANION GAP SERPL CALC-SCNC: 7 MMOL/L (ref 3–18)
BASOPHILS # BLD: 0 K/UL (ref 0–0.1)
BASOPHILS NFR BLD: 0 % (ref 0–2)
BLD PROD TYP BPU: NORMAL
BLD PROD TYP BPU: NORMAL
BLOOD BANK DISPENSE STATUS: NORMAL
BLOOD BANK DISPENSE STATUS: NORMAL
BLOOD GROUP ANTIBODIES SERPL: NEGATIVE
BPU ID: NORMAL
BPU ID: NORMAL
BUN SERPL-MCNC: 12 MG/DL (ref 7–18)
BUN/CREAT SERPL: 23 (ref 12–20)
CA-I BLD-MCNC: 8.6 MG/DL (ref 8.5–10.1)
CHLORIDE SERPL-SCNC: 97 MMOL/L (ref 100–111)
CO2 SERPL-SCNC: 31 MMOL/L (ref 21–32)
CREAT SERPL-MCNC: 0.52 MG/DL (ref 0.6–1.3)
CROSSMATCH RESULT: NORMAL
CROSSMATCH RESULT: NORMAL
DIFFERENTIAL METHOD BLD: ABNORMAL
EKG ATRIAL RATE: 107 BPM
EKG DIAGNOSIS: NORMAL
EKG P AXIS: 56 DEGREES
EKG P-R INTERVAL: 134 MS
EKG Q-T INTERVAL: 350 MS
EKG QRS DURATION: 96 MS
EKG QTC CALCULATION (BAZETT): 467 MS
EKG R AXIS: -16 DEGREES
EKG T AXIS: 82 DEGREES
EKG VENTRICULAR RATE: 107 BPM
EOSINOPHIL # BLD: 0 K/UL (ref 0–0.4)
EOSINOPHIL NFR BLD: 1 % (ref 0–5)
ERYTHROCYTE [DISTWIDTH] IN BLOOD BY AUTOMATED COUNT: 18 % (ref 11.6–14.5)
FERRITIN SERPL-MCNC: 1628 NG/ML (ref 8–388)
FOLATE SERPL-MCNC: 10.1 NG/ML (ref 3.1–17.5)
GLUCOSE SERPL-MCNC: 106 MG/DL (ref 74–99)
HCT VFR BLD AUTO: 20.6 % (ref 36–48)
HCT VFR BLD AUTO: 24.1 % (ref 36–48)
HGB BLD-MCNC: 6.3 G/DL (ref 13–16)
HGB BLD-MCNC: 7.6 G/DL (ref 13–16)
IMM GRANULOCYTES # BLD AUTO: 0 K/UL
IMM GRANULOCYTES NFR BLD AUTO: 0 %
IRON SATN MFR SERPL: 27 % (ref 20–50)
IRON SERPL-MCNC: 55 UG/DL (ref 50–175)
LYMPHOCYTES # BLD: 1.8 K/UL (ref 0.9–3.6)
LYMPHOCYTES NFR BLD: 40 % (ref 21–52)
MCH RBC QN AUTO: 27.9 PG (ref 24–34)
MCH RBC QN AUTO: 28.9 PG (ref 24–34)
MCHC RBC AUTO-ENTMCNC: 30.6 G/DL (ref 31–37)
MCHC RBC AUTO-ENTMCNC: 31.5 G/DL (ref 31–37)
MCV RBC AUTO: 91.2 FL (ref 78–100)
MONOCYTES # BLD: 0.4 K/UL (ref 0.05–1.2)
MONOCYTES NFR BLD: 9 % (ref 3–10)
NEUTS BAND NFR BLD MANUAL: 3 % (ref 0–5)
NEUTS SEG # BLD: 2.3 K/UL (ref 1.8–8)
NEUTS SEG NFR BLD: 47 % (ref 40–73)
NRBC # BLD: 0.05 K/UL
NRBC # BLD: 0.21 K/UL (ref 0–0.01)
NRBC BLD MANUAL-RTO: 1 PER 100 WBC
NRBC BLD-RTO: 4.6 PER 100 WBC
PLATELET # BLD AUTO: 132 K/UL (ref 135–420)
PMV BLD AUTO: 9.6 FL (ref 9.2–11.8)
POTASSIUM SERPL-SCNC: 3.8 MMOL/L (ref 3.5–5.5)
RBC # BLD AUTO: 2.26 M/UL (ref 4.35–5.65)
RBC MORPH BLD: ABNORMAL
SODIUM SERPL-SCNC: 135 MMOL/L (ref 136–145)
SPECIMEN EXP DATE BLD: NORMAL
TIBC SERPL-MCNC: 204 UG/DL (ref 250–450)
TRANSFUSION STATUS PATIENT QL: NORMAL
TRANSFUSION STATUS PATIENT QL: NORMAL
UNIT DIVISION: 0
UNIT DIVISION: 0
VIT B12 SERPL-MCNC: 319 PG/ML (ref 211–911)
WBC # BLD AUTO: 4.6 K/UL (ref 4.6–13.2)

## 2023-10-14 PROCEDURE — 6370000000 HC RX 637 (ALT 250 FOR IP): Performed by: NURSE PRACTITIONER

## 2023-10-14 PROCEDURE — 85025 COMPLETE CBC W/AUTO DIFF WBC: CPT

## 2023-10-14 PROCEDURE — P9016 RBC LEUKOCYTES REDUCED: HCPCS

## 2023-10-14 PROCEDURE — 85014 HEMATOCRIT: CPT

## 2023-10-14 PROCEDURE — 80048 BASIC METABOLIC PNL TOTAL CA: CPT

## 2023-10-14 PROCEDURE — 36415 COLL VENOUS BLD VENIPUNCTURE: CPT

## 2023-10-14 PROCEDURE — 36430 TRANSFUSION BLD/BLD COMPNT: CPT

## 2023-10-14 PROCEDURE — 85018 HEMOGLOBIN: CPT

## 2023-10-14 RX ORDER — SODIUM CHLORIDE 9 MG/ML
INJECTION, SOLUTION INTRAVENOUS PRN
Status: DISCONTINUED | OUTPATIENT
Start: 2023-10-14 | End: 2023-10-14 | Stop reason: HOSPADM

## 2023-10-14 RX ADMIN — FUROSEMIDE 20 MG: 20 TABLET ORAL at 08:06

## 2023-10-14 RX ADMIN — HYDROCODONE BITARTRATE AND ACETAMINOPHEN 1 TABLET: 5; 325 TABLET ORAL at 10:21

## 2023-10-14 ASSESSMENT — PAIN SCALES - WONG BAKER: WONGBAKER_NUMERICALRESPONSE: 0

## 2023-10-14 ASSESSMENT — PAIN SCALES - GENERAL
PAINLEVEL_OUTOF10: 5
PAINLEVEL_OUTOF10: 0

## 2023-10-14 ASSESSMENT — PAIN - FUNCTIONAL ASSESSMENT: PAIN_FUNCTIONAL_ASSESSMENT: ACTIVITIES ARE NOT PREVENTED

## 2023-10-14 ASSESSMENT — PAIN DESCRIPTION - LOCATION: LOCATION: SHOULDER

## 2023-10-14 ASSESSMENT — PAIN DESCRIPTION - DESCRIPTORS: DESCRIPTORS: ACHING

## 2023-10-14 ASSESSMENT — PAIN DESCRIPTION - ORIENTATION: ORIENTATION: LEFT

## 2023-10-14 NOTE — PROGRESS NOTES
1900-Assumed care of pt from off going nurse. 2200-HS meds given, primofit applied and tolerated, dinner heated up no other needs voiced, call bell in reach, will continue to monitor. 2313-PRN norco given per request and fresh ice water no other needs voiced. 0000-Pt hob adjusted per pt  request, no other needs voiced, primofit intact and tolerated, call bell in reach. 0400-Pt resting in bed no acute distress or sob, call bel in reach. 0630-Primofit in place, canister emptied of urine, call bell in reach.

## 2023-10-14 NOTE — CONSENT
Informed Consent for Blood Component Transfusion Note    I have discussed with the mother and patient the rationale for blood component transfusion; its benefits in treating or preventing fatigue, organ damage, or death; and its risk which includes mild transfusion reactions, rare risk of blood borne infection, or more serious but rare reactions. I have discussed the alternatives to transfusion, including the risk and consequences of not receiving transfusion. The patient and mother had an opportunity to ask questions and had agreed to proceed with transfusion of blood components.     Electronically signed by BRYAN Valentine NP on 10/13/23 at 8:22 PM EDT

## 2023-10-14 NOTE — PROGRESS NOTES
0700- Assumed care of patient. Patient resting in bed watching tv. No distress noted. Patient denies any needs. Shift assessment complete. CB in reach     0806- Am medications administered. Denies any needs. CB in reach     0931- PRBC started as ordered by Florentino Torres RN.       3144- Blood transfusion complete. VSS patient tolerated well with no complaints. 1312- Hemoglobin 7.6. patient to be discharged. 1450- Patient discharged home with sister. All belongings with patient. IV and tele removed.

## 2023-10-14 NOTE — DISCHARGE SUMMARY
Discharge Summary       PATIENT ID: Hernán Tenorio  MRN: [id number]   YOB: 1960    DATE OF ADMISSION: 10/13/2023 11:03 AM    DATE OF DISCHARGE: 10/14/23    PRIMARY CARE PROVIDER: BRYAN Bunch - NP     ATTENDING PHYSICIAN: Bridgette Sal MD  DISCHARGING PROVIDER: Bridgette Sal MD        CONSULTATIONS: None    PROCEDURES/SURGERIES: * No surgery found *    ADMITTING [facility] [address] COURSE:   Hernán Tenorio is a 66 y.o. male with a past medical history significant for obesity, gout, and DM-II(controlled) intellectual disability, who presents to the ED today with complaints of shortness of breath in the last 1 week. History is obtained by the patient in the ED and supplemented by his mother who is present at bedside. Patient complains of increasing shortness of breath on exertion, in the last approximately 1 week. He complains of back pain, and LLE pain and swelling. States he saw his family doctor [facility] a week ago primarily for the LLE pain and swelling, who did labs, and started a low dose lasix. Mother stated they were supposed to see him again next Monday for a referral plan. ED work-up shows a low Hg level of 6.4, for why hospitalist was requested to admit for blood transfusion. Patient denies any use of aspirin, ibuprofen or NSAIDs. He denies any hematemesis, hematochezia or melena. No chest pains. He complains of generalized weakness. No other complaints voiced. Per chart records, it appears he had an elevated PSA level of 439 on 10/2/2023, and his Hg was 6.4 at the time too. CTA of his chest, abdomen and pelvis today shows extensive lymphadenopathy with extensive osseous metastatic disease, and enlarged left axillary node and moderate bilateral pleural effusions. We agreed to inpatient admission criteria for symptomatic anemia.         DISCHARGE DIAGNOSES / PLAN:         Assessment & Plan:      #1: Symptomatic anemia:  -presenting with GO x 1 week, and generalized weakness.   -Hg is 6.4, Hct 21, Mcv 93.  -admit to inpatient, monitor in telemetry   -transfuse 1 unit PRBC  -check iron panel, ferritin, Vit B12, Folate levels. Check occult stool.   -No aspirin,NSAIDs, etc for now. -CBC, BMP in a.m. 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 He has an elevated  on 10/2/23.  -This is a new diagnosis for the patient, and mother who just found out today.  -Elevated  10/3/23--Need outpatient urology, hema/onc f/up. #3: LLE Edema:  -venous doppler US neg for DVT, it again shows Incidental finding of prominent appearing lymph nodes and hypoechoic areas noted in the left groin.          Day of dc  Pt ok for dc when hgb over 7, likely after this second unit  Pt and mother understand something else going on, possible malignancy, has appts coming up to further work up      [address]:  Current Discharge Medication List        CONTINUE these medications which have NOT CHANGED    Details   furosemide (LASIX) 20 MG tablet Take 1 tablet by mouth daily as needed (Take as needed for swelling in legs and feet)  Qty: 90 tablet, Refills: 1    Associated Diagnoses: Bilateral leg edema           STOP taking these medications       Acetaminophen (TYLENOL ARTHRITIS PAIN PO) Comments:   Reason for Stopping:         colchicine (COLCRYS) 0.6 MG tablet Comments:   Reason for Stopping:         allopurinol (ZYLOPRIM) 100 MG tablet Comments:   Reason for Stopping:         losartan (COZAAR) 100 MG tablet Comments:   Reason for Stopping:         finasteride (PROSCAR) 5 MG tablet Comments:   Reason for Stopping:         albuterol sulfate HFA (PROVENTIL;VENTOLIN;PROAIR) 108 (90 Base) MCG/ACT inhaler Comments:   Reason for Stopping:         atorvastatin (LIPITOR) 10 MG tablet Comments:   Reason for Stopping:

## 2023-10-14 NOTE — CARE COORDINATION
Case Management Assessment  Initial Evaluation    Date/Time of Evaluation: 10/14/2023 2:41 PM  Assessment Completed by: Migdalia Lagunas    If patient is discharged prior to next notation, then this note serves as note for discharge by case management. Patient Name: Elian Nearing                   YOB: 1960  Diagnosis: Metastasis to bone ([address]) [C79.51]  Dyspnea on exertion [R06.09]  Anemia [D64.9]  Pleural effusion, bilateral [J90]  Symptomatic anemia [D64.9]                   Date / Time: 10/13/2023 11:03 AM    Patient Admission Status: Inpatient   Readmission Risk (Low < 19, Mod (19-27), High > 27): Readmission Risk Score: 14.4    Current PCP: BRYAN Yeh NP  PCP verified by ? Chart Reviewed: Yes      History Provided by:    Patient Orientation:      Patient Cognition:      Hospitalization in the last 30 days (Readmission):  No    If yes, Readmission Assessment in  Navigator will be completed. Advance Directives:      Code Status: Full Code   Patient's Primary Decision Maker is: Legal Next of Kin      Discharge Planning:    Patient lives with: Family Members Type of Home: House  Primary Care Giver:    Patient Support Systems include: Family Members   Current Financial resources:    Current community resources:    Current services prior to admission: None            Current DME:              Type of Home Care services:  None    ADLS  Prior functional level:    Current functional level:      PT AM-PAC:   /24  OT AM-PAC:   /24    Family can provide assistance at DC: Would you like Case Management to discuss the discharge plan with any other family members/significant others, and if so, who?     Plans to Return to Present Housing:    Other Identified Issues/Barriers to RETURNING to current housing: yes  Potential Assistance needed at discharge: N/A            Potential DME:    Patient expects to discharge to: [address] [place] [address] for transportation at discharge:

## 2023-10-14 NOTE — PROGRESS NOTES
2680:  1 unit PRBC initiated, see flowsheet for blood administration. Patient educated on s/s of transfusion reactions and to report symptoms immediately. 1860:  Patient observed or initial 15 minutes by this RN at bedside, reports/displays no adverse reactions to transfusion and is tolerating well at this time. VSS. Rate increased, see flowsheet. CBWR.

## 2023-10-16 ENCOUNTER — TELEPHONE (OUTPATIENT)
Facility: CLINIC | Age: 63
End: 2023-10-16

## 2023-10-16 NOTE — TELEPHONE ENCOUNTER
Care Transitions Initial Follow Up Call    Outreach made within 2 business days of discharge: Yes    Patient: Rene Atkins Patient : 1960   MRN: [id number]  Reason for Admission: There are no discharge diagnoses documented for the most recent discharge. Discharge Date: 10/14/23       Spoke with: Patients Mother Te Aguero. Discharge department/facility: [facility]. TCM Interactive Patient Contact:  Was patient able to fill all prescriptions: Patients mother said she was unaware if he had any prescriptions. Was patient instructed to bring all medications to the follow-up visit: Yes  Is patient taking all medications as directed in the discharge summary?  No   Does patient understand their discharge instructions: Yes  Does patient have questions or concerns that need addressed prior to 7-14 day follow up office visit: no    Scheduled appointment with PCP within 7-14 days    Follow Up  Future Appointments   Date Time Provider [address] [place] [address]   10/17/2023  9:30 AM Garrett Galvez MD [facility] BS AMB   10/18/2023  1:00 PM BRYAN Jeffers NP [facility] BS AMB   2023 11:00 AM BRYAN Jeffers NP [facility] BS AMB   2023  2:40 PM Jose G Cabrera MD [facility] BS AMB   3/4/2024 10:30 AM BRYAN Jeffers NP [facility] BS AMB       Milton Alvarez MA

## 2023-10-17 ENCOUNTER — OFFICE VISIT (OUTPATIENT)
Age: 63
End: 2023-10-17
Payer: MEDICAID

## 2023-10-17 ENCOUNTER — TELEPHONE (OUTPATIENT)
Age: 63
End: 2023-10-17

## 2023-10-17 VITALS
DIASTOLIC BLOOD PRESSURE: 69 MMHG | HEIGHT: 70 IN | BODY MASS INDEX: 32.35 KG/M2 | OXYGEN SATURATION: 100 % | WEIGHT: 226 LBS | HEART RATE: 98 BPM | SYSTOLIC BLOOD PRESSURE: 131 MMHG

## 2023-10-17 DIAGNOSIS — C61 MALIGNANT NEOPLASM OF PROSTATE METASTATIC TO LYMPH NODES OF MULTIPLE SITES (HCC): ICD-10-CM

## 2023-10-17 DIAGNOSIS — R82.81 PYURIA: ICD-10-CM

## 2023-10-17 DIAGNOSIS — R97.20 ELEVATED PSA: Primary | ICD-10-CM

## 2023-10-17 DIAGNOSIS — E11.9 TYPE 2 DIABETES MELLITUS WITHOUT COMPLICATION, UNSPECIFIED WHETHER LONG TERM INSULIN USE (HCC): ICD-10-CM

## 2023-10-17 DIAGNOSIS — R97.20 ELEVATED PROSTATE SPECIFIC ANTIGEN (PSA): ICD-10-CM

## 2023-10-17 DIAGNOSIS — C61 PROSTATE CANCER METASTATIC TO BONE (HCC): ICD-10-CM

## 2023-10-17 DIAGNOSIS — C77.8 MALIGNANT NEOPLASM OF PROSTATE METASTATIC TO LYMPH NODES OF MULTIPLE SITES (HCC): ICD-10-CM

## 2023-10-17 DIAGNOSIS — C79.51 PROSTATE CANCER METASTATIC TO BONE (HCC): ICD-10-CM

## 2023-10-17 LAB
ABO + RH BLD: NORMAL
BILIRUBIN, URINE, POC: NEGATIVE
BLD PROD TYP BPU: NORMAL
BLD PROD TYP BPU: NORMAL
BLOOD BANK DISPENSE STATUS: NORMAL
BLOOD BANK DISPENSE STATUS: NORMAL
BLOOD GROUP ANTIBODIES SERPL: NEGATIVE
BLOOD URINE, POC: NEGATIVE
BPU ID: NORMAL
BPU ID: NORMAL
CROSSMATCH RESULT: NORMAL
CROSSMATCH RESULT: NORMAL
GLUCOSE URINE, POC: NEGATIVE
KETONES, URINE, POC: NEGATIVE
LEUKOCYTE ESTERASE, URINE, POC: NORMAL
NITRITE, URINE, POC: NEGATIVE
PH, URINE, POC: 5.5 (ref 4.6–8)
PROTEIN,URINE, POC: NORMAL
SPECIFIC GRAVITY, URINE, POC: 1.02 (ref 1–1.03)
SPECIMEN EXP DATE BLD: NORMAL
TRANSFUSION STATUS PATIENT QL: NORMAL
TRANSFUSION STATUS PATIENT QL: NORMAL
UNIT DIVISION: 0
UNIT DIVISION: 0
URINALYSIS CLARITY, POC: CLEAR
URINALYSIS COLOR, POC: YELLOW
UROBILINOGEN, POC: NORMAL

## 2023-10-17 PROCEDURE — 81003 URINALYSIS AUTO W/O SCOPE: CPT | Performed by: UROLOGY

## 2023-10-17 PROCEDURE — 3044F HG A1C LEVEL LT 7.0%: CPT | Performed by: UROLOGY

## 2023-10-17 PROCEDURE — 99215 OFFICE O/P EST HI 40 MIN: CPT | Performed by: UROLOGY

## 2023-10-17 PROCEDURE — 3078F DIAST BP <80 MM HG: CPT | Performed by: UROLOGY

## 2023-10-17 PROCEDURE — 3075F SYST BP GE 130 - 139MM HG: CPT | Performed by: UROLOGY

## 2023-10-17 RX ORDER — BICALUTAMIDE 50 MG/1
50 TABLET, FILM COATED ORAL DAILY
Qty: 90 TABLET | Refills: 3 | Status: SHIPPED | OUTPATIENT
Start: 2023-10-17

## 2023-10-17 RX ORDER — ALLOPURINOL 100 MG/1
100 TABLET ORAL DAILY
COMMUNITY

## 2023-10-17 RX ORDER — LOSARTAN POTASSIUM 100 MG/1
100 TABLET ORAL DAILY
COMMUNITY

## 2023-10-17 ASSESSMENT — ENCOUNTER SYMPTOMS
SHORTNESS OF BREATH: 1
BACK PAIN: 1

## 2023-10-17 NOTE — TELEPHONE ENCOUNTER
Anjelica Collado stated patient in dier need of Lupron injection today. I began insurance prior auth process calling [phone] spoke to mendy who transferred me to Oncology Pharmacy [phone] I spoke to [facility] who instructed me to complete [employer] forms  which I completed and faxed    I then was transferred to medical management to expedite PA Process [phone] at which time I was on hold for over 45min and then got disconnected. Updated Anjelica Collado, who stated please wait and see if we hear anything.

## 2023-10-17 NOTE — PROGRESS NOTES
HISTORY OF PRESENT ILLNESS  Hernán Tenorio is a 66 y.o. male. Chief Complaint   Patient presents with    Follow-up Chronic Condition    Elevated PSA     Patient we saw him back on September a year ago. His PSA had doubled then. Told his mother and he that he probably had prostate cancer and the need to be biopsied and treated. He was set up for prostate biopsy. He never came in. He is missed several appointments with us his PSA is now well over 480. Widely metastatic disease lymphadenopathy etc.  Patient needs medical oncology. He needs a Lupron shot. He needs proper evaluation and treatment and not refusal of proper care. As his patient has failed to come back for numerous visits knowing he has metastatic prostate cancer we will start him on Casodex today we will go ahead of the Lupron shot. There is a slight chance he could have swelling and cancer flare with problems but I think the benefit outweighs the risk given that his noncompliance and that we will give a shot today I am unsure he will come back another day and will probably die from this cancer, if the shot is not given. the prescription has been given for casodex as well. He needs a oncologist to start xtandi or another similar drug as well. With the non compliance I am not sure that is going to happen. Patient denies the symptoms of COVID-19 per routine screening guidelines. Review of Systems   Respiratory:  Positive for shortness of breath. Musculoskeletal:  Positive for back pain. All other systems reviewed and are negative. Past Medical History:  PMHx (including negatives):  has a past medical history of Diabetes ([address]), Elevated PSA, Gout, Gout, High cholesterol, Hyperlipemia, Hypertension, and Pulmonary disease. PSurgHx:  has a past surgical history that includes Colonoscopy (N/A, 5/28/2021) and other surgical history. PSocHx:  reports that he quit smoking about 22 years ago. His smoking use included cigarettes.  He

## 2023-10-18 ENCOUNTER — OFFICE VISIT (OUTPATIENT)
Facility: CLINIC | Age: 63
End: 2023-10-18
Payer: MEDICAID

## 2023-10-18 VITALS
BODY MASS INDEX: 32.78 KG/M2 | TEMPERATURE: 98.5 F | RESPIRATION RATE: 18 BRPM | DIASTOLIC BLOOD PRESSURE: 68 MMHG | OXYGEN SATURATION: 100 % | WEIGHT: 229 LBS | HEIGHT: 70 IN | SYSTOLIC BLOOD PRESSURE: 120 MMHG | HEART RATE: 97 BPM

## 2023-10-18 DIAGNOSIS — C61 PROSTATE CANCER (HCC): ICD-10-CM

## 2023-10-18 DIAGNOSIS — Z09 HOSPITAL DISCHARGE FOLLOW-UP: Primary | ICD-10-CM

## 2023-10-18 DIAGNOSIS — M54.50 LUMBAR PAIN: ICD-10-CM

## 2023-10-18 PROCEDURE — 3078F DIAST BP <80 MM HG: CPT | Performed by: NURSE PRACTITIONER

## 2023-10-18 PROCEDURE — 3074F SYST BP LT 130 MM HG: CPT | Performed by: NURSE PRACTITIONER

## 2023-10-18 PROCEDURE — 99215 OFFICE O/P EST HI 40 MIN: CPT | Performed by: NURSE PRACTITIONER

## 2023-10-18 PROCEDURE — 1111F DSCHRG MED/CURRENT MED MERGE: CPT | Performed by: NURSE PRACTITIONER

## 2023-10-18 RX ORDER — TRAMADOL HYDROCHLORIDE 50 MG/1
50 TABLET ORAL EVERY 6 HOURS PRN
Qty: 120 TABLET | Refills: 0 | Status: SHIPPED | OUTPATIENT
Start: 2023-10-18 | End: 2023-11-17

## 2023-10-18 NOTE — PROGRESS NOTES
History of Present Illness  Akshat Collado is a 66 y.o. male who presents today for:    Chief Complaint   Patient presents with    Follow-Up from Hospital     Pt was hospitalized at [facility] 10/13/23-10/14/23  Pt reports still having back pain. Past Medical History  Past Medical History:   Diagnosis Date    Diabetes ([address])     Elevated PSA     Gout     Gout     High cholesterol     Hyperlipemia     Hypertension     Pulmonary disease         Surgical History  Past Surgical History:   Procedure Laterality Date    COLONOSCOPY N/A 2021    COLONOSCOPY (TIVA) performed by Surinder Bland MD at [facility] ENDOSCOPY    OTHER SURGICAL HISTORY      cyst removed-foot        Current Medications  Current Outpatient Medications   Medication Sig    losartan (COZAAR) 100 MG tablet Take 1 tablet by mouth daily    bicalutamide (CASODEX) 50 MG chemo tablet Take 1 tablet by mouth daily    furosemide (LASIX) 20 MG tablet Take 1 tablet by mouth daily as needed (Take as needed for swelling in legs and feet)    allopurinol (ZYLOPRIM) 100 MG tablet Take 1 tablet by mouth daily (Patient not taking: Reported on 10/17/2023)     Current Facility-Administered Medications   Medication Dose Route Frequency    leuprolide (LUPRON) injection 45 mg  45 mg IntraMUSCular Once       Allergies/Drug Reactions  Allergies   Allergen Reactions    Bee Venom Swelling        Family History  Family History   Problem Relation Age of Onset    Cancer Maternal Grandmother         colon    Cancer Maternal Grandfather         bone    Asthma Mother     Hypertension Sister         Social History  Social History     Tobacco Use    Smoking status: Former     Packs/day: 0.50     Years: 30.00     Additional pack years: 0.00     Total pack years: 15.00     Types: Cigarettes     Quit date: 3/1/2001     Years since quittin.6    Smokeless tobacco: Never   Vaping Use    Vaping Use: Never used   Substance Use Topics    Alcohol use:  Yes     Alcohol/week: 12.0 standard

## 2023-10-18 NOTE — PROGRESS NOTES
Windy Don presents today for   Chief Complaint   Patient presents with    Follow-Up from Hospital     Pt was hospitalized at [facility] 10/13/23-10/14/23  Pt reports still having back pain. Is someone accompanying this pt? Yes, sister     Is the patient using any DME equipment during [address]? No    Health Maintenance reviewed and discussed and ordered per Provider. Health Maintenance Due   Topic Date Due    COVID-19 Vaccine (1) Never done    DTaP/Tdap/Td vaccine (1 - Tdap) Never done    Shingles vaccine (1 of 2) Never done    Pneumococcal 0-64 years Vaccine (2 - PCV) 09/19/2020    Hepatitis B vaccine (1 of 3 - Risk 3-dose series) Never done    Flu vaccine (1) Never done   . Coordination of Care:  1. \"Have you been to the ER, urgent care clinic since your last visit? Hospitalized since your last visit? \" Yes    2. \"Have you seen or consulted any other health care providers outside of the [address] [place] [address] since your last visit? \" No    3. For patients aged 43-73: Has the patient had a colonoscopy? No    If the patient is female:    4. For patients aged 43-66: Has the patient had a mammogram within the past 2 years? N/A based on age/sex    5. For patients aged 21-65: Has the patient had a pap smear?  N/A based on age/sex

## 2023-10-19 ENCOUNTER — NURSE ONLY (OUTPATIENT)
Age: 63
End: 2023-10-19

## 2023-10-19 DIAGNOSIS — C61 MALIGNANT NEOPLASM OF PROSTATE METASTATIC TO LYMPH NODES OF MULTIPLE SITES (HCC): Primary | ICD-10-CM

## 2023-10-19 DIAGNOSIS — C77.8 MALIGNANT NEOPLASM OF PROSTATE METASTATIC TO LYMPH NODES OF MULTIPLE SITES (HCC): Primary | ICD-10-CM

## 2023-10-19 LAB — BACTERIA UR CULT: NORMAL

## 2024-01-10 ENCOUNTER — OFFICE VISIT (OUTPATIENT)
Facility: CLINIC | Age: 64
End: 2024-01-10
Payer: MEDICAID

## 2024-01-10 VITALS
TEMPERATURE: 97.3 F | BODY MASS INDEX: 34.65 KG/M2 | OXYGEN SATURATION: 99 % | SYSTOLIC BLOOD PRESSURE: 118 MMHG | HEART RATE: 76 BPM | HEIGHT: 70 IN | RESPIRATION RATE: 18 BRPM | DIASTOLIC BLOOD PRESSURE: 66 MMHG | WEIGHT: 242 LBS

## 2024-01-10 DIAGNOSIS — H61.23 IMPACTED CERUMEN, BILATERAL: ICD-10-CM

## 2024-01-10 DIAGNOSIS — C61 PROSTATE CANCER (HCC): ICD-10-CM

## 2024-01-10 DIAGNOSIS — I10 ESSENTIAL HYPERTENSION: Primary | ICD-10-CM

## 2024-01-10 PROCEDURE — 3078F DIAST BP <80 MM HG: CPT | Performed by: NURSE PRACTITIONER

## 2024-01-10 PROCEDURE — 3074F SYST BP LT 130 MM HG: CPT | Performed by: NURSE PRACTITIONER

## 2024-01-10 PROCEDURE — 69210 REMOVE IMPACTED EAR WAX UNI: CPT | Performed by: NURSE PRACTITIONER

## 2024-01-10 PROCEDURE — 99214 OFFICE O/P EST MOD 30 MIN: CPT | Performed by: NURSE PRACTITIONER

## 2024-01-10 ASSESSMENT — PATIENT HEALTH QUESTIONNAIRE - PHQ9
SUM OF ALL RESPONSES TO PHQ QUESTIONS 1-9: 0
SUM OF ALL RESPONSES TO PHQ QUESTIONS 1-9: 0
SUM OF ALL RESPONSES TO PHQ9 QUESTIONS 1 & 2: 0
2. FEELING DOWN, DEPRESSED OR HOPELESS: 0
SUM OF ALL RESPONSES TO PHQ QUESTIONS 1-9: 0
1. LITTLE INTEREST OR PLEASURE IN DOING THINGS: 0
SUM OF ALL RESPONSES TO PHQ QUESTIONS 1-9: 0

## 2024-01-10 NOTE — PROGRESS NOTES
George Wong presents today for   Chief Complaint   Patient presents with    Follow-up     3m follow up       Is someone accompanying this pt? yes    Is the patient using any DME equipment during OV? no    Health Maintenance reviewed and discussed and ordered per Provider.    Health Maintenance Due   Topic Date Due    COVID-19 Vaccine (1) Never done    DTaP/Tdap/Td vaccine (1 - Tdap) Never done    Shingles vaccine (1 of 2) Never done    Pneumococcal 0-64 years Vaccine (2 - PCV) 09/19/2020    Respiratory Syncytial Virus (RSV) Pregnant or age 60 yrs+ (1 - 1-dose 60+ series) Never done    Flu vaccine (1) Never done   .      Coordination of Care:  1. \"Have you been to the ER, urgent care clinic since your last visit?  Hospitalized since your last visit?\" No    2. \"Have you seen or consulted any other health care providers outside of the [facility] since your last visit?\" No    3. For patients aged 45-75: Has the patient had a colonoscopy? Yes- No care gap present    If the patient is female:    4. For patients aged 40-74: Has the patient had a mammogram within the past 2 years? N/A based on age/sex    5. For patients aged 21-65: Has the patient had a pap smear? N/A based on age/sex    
arrhythmias or murmurs.  Bilateral lung sounds clear to auscultation in all fields.  Abdomen soft and nontender, bowel sounds normoactive in all 4 quadrants.  There is no observed bilateral lower extremity edema.     Assessment/Plan:    Essential hypertension.  Continue Losartan 100 mg tablet daily for management of essential hypertension.  Prostate cancer (HCC).  Continue Casodex 50 mg tablet daily.  Impacted cerumen, bilateral.  Performed bilateral ear cerumen disimpaction with instrumentation at this visit.      I have discussed the diagnosis with the patient and the intended plan as seen in the above orders.  The patient has received an after-visit summary and questions were answered concerning future plans.  I have discussed medication side effects and warnings with the patient as well. I have reviewed the plan of care with the patient, accepted their input and they are in agreement with the treatment goals.       Tanner Eaton, BRYAN - NP  January 10, 2024

## 2024-01-11 LAB
INR, EXTERNAL: 1.03
PT, EXTERNAL: 11.4

## 2024-02-02 RX ORDER — ALLOPURINOL 100 MG/1
200 TABLET ORAL DAILY
Qty: 180 TABLET | Refills: 0 | Status: SHIPPED | OUTPATIENT
Start: 2024-02-02

## 2024-04-17 RX ORDER — LEUPROLIDE ACETATE 45 MG
45 KIT INTRAMUSCULAR
Qty: 1 EACH | Refills: 1 | Status: SHIPPED | OUTPATIENT
Start: 2024-04-17

## 2024-04-19 ENCOUNTER — TELEPHONE (OUTPATIENT)
Age: 64
End: 2024-04-19

## 2024-04-19 NOTE — TELEPHONE ENCOUNTER
Last Lupron given 10.19.2023 (45mg 6mos) next due after April 20, 2024.  Approval received via phone conversation for 2 lupron kits *see referral 10/19/2023.    WANDA:       Patient has apt with [place] 4/22/24 - this should be when patient receives 2nd Lupron.  (I have one saved for pt) Do you want the apt changed to NP visit with Suzy?      Suzy has sent order to [facility] for future administrations.  I will continue to followup with [facility] every couple of days.

## 2024-04-22 ENCOUNTER — OFFICE VISIT (OUTPATIENT)
Age: 64
End: 2024-04-22

## 2024-04-22 VITALS
RESPIRATION RATE: 18 BRPM | OXYGEN SATURATION: 98 % | BODY MASS INDEX: 34.5 KG/M2 | WEIGHT: 241 LBS | SYSTOLIC BLOOD PRESSURE: 145 MMHG | HEART RATE: 105 BPM | DIASTOLIC BLOOD PRESSURE: 81 MMHG | HEIGHT: 70 IN

## 2024-04-22 DIAGNOSIS — C77.8 MALIGNANT NEOPLASM OF PROSTATE METASTATIC TO LYMPH NODES OF MULTIPLE SITES (HCC): ICD-10-CM

## 2024-04-22 DIAGNOSIS — C61 PROSTATE CANCER (HCC): Primary | ICD-10-CM

## 2024-04-22 DIAGNOSIS — C61 MALIGNANT NEOPLASM OF PROSTATE METASTATIC TO LYMPH NODES OF MULTIPLE SITES (HCC): ICD-10-CM

## 2024-04-22 NOTE — PROGRESS NOTES
Chief Complaint   Patient presents with    Follow-up     Lupron injection     1. Have you been to the ER, urgent care clinic since your last visit?  Hospitalized since your last visit?No    2. Have you seen or consulted any other health care providers outside of the [facility] since your last visit?  Include any pap smears or colon screening. No  BP (!) 145/81 (Site: Right Upper Arm, Position: Sitting, Cuff Size: Large Adult)   Pulse (!) 105   Resp 18   Ht 1.778 m (5' 10\")   Wt 109.3 kg (241 lb)   SpO2 98%   BMI 34.58 kg/m²

## 2024-04-22 NOTE — PROGRESS NOTES
HISTORY OF PRESENT ILLNESS    George Wong is a 64 y.o. male is here for his lupron injection. He has metastatic prostate cancer. He has missed several appointment s with us. He is currently under the care of of medical oncology  AS per last note from medical oncology on 3/18/2024 the plan is to start him on Docetaxel chemotherapy   and to repeat  CT and bone scan   PSA:  12/14/2023=7.23  11/30/2023=9.45           Past Medical History:  PMHx (including negatives):  has a past medical history of Diabetes (HCC), Elevated PSA, Gout, Gout, High cholesterol, Hyperlipemia, Hypertension, and Pulmonary disease.   PSurgHx:  has a past surgical history that includes Colonoscopy (N/A, 5/28/2021) and other surgical history.  PSocHx:  reports that he quit smoking about 23 years ago. His smoking use included cigarettes. He started smoking about 53 years ago. He has a 15.0 pack-year smoking history. He has never used smokeless tobacco. He reports current alcohol use of about 12.0 standard drinks of alcohol per week. He reports that he does not use drugs.     1. Prostate cancer (HCC)  -     PSA, Diagnostic; Future  2. Malignant neoplasm of prostate metastatic to lymph nodes of multiple sites (HCC)  Overview:    He was seen in the office on 8/2022 for the first  time with elevated PSA   He was set up for prostate biopsy. He never came in. He is missed several appointments with us   He has metastatic diseases. Ct scan on 10/2023 reveled  extensive lymphadenopathy,enlarged lymph nodes in the left inguinal region and retroperitoneum and extensive osseous metastatic disease.    On 10/17/2023 appointment he was prescribed casodex and received first  Lupron injection on 10/19/2023. He was referred to medical oncology    He is a patient  at [facility] [place]      AS per last note from medical oncology on 3/18/2024 the plan is to start him on Docetaxel chemotherapy   and to repeat  CT and bone scan

## 2024-04-24 DIAGNOSIS — C61 PROSTATE CANCER (HCC): ICD-10-CM

## 2024-04-25 ENCOUNTER — TELEPHONE (OUTPATIENT)
Age: 64
End: 2024-04-25

## 2024-04-25 NOTE — TELEPHONE ENCOUNTER
Prior authorization form for lupron was provided to suzy. Need completed within 24 hours     leuprolide (LUPRON DEPOT, 6-MONTH,) 45 MG injection [[id number]]    Order Details  Dose: 45 mg Route: IntraMUSCular Frequency: EVERY 6 MONTHS   Dispense Quantity: 1 each Refills: 1          Sig: Inject 45 mg into the muscle every 6 months         Start Date: 04/17/24 End Date: --   Written Date: 04/17/24 Expiration Date: 04/17/25   Providers    Authorizing Provider: Suzy Gibson APRN - NP  NPI: [id number]   Ordering User: Suzy Gibson APRN - NP          Pharmacy    [facility] - [address] -  [phone] - F [phone]  [address] [place] [id number]  Phone: [phone]  Fax: [phone]

## 2024-04-25 NOTE — TELEPHONE ENCOUNTER
Form faxed and scanned in. I am going out on maternity leave. Please follow up with this and check on the status prior to pts apt.

## 2024-05-07 ENCOUNTER — OFFICE VISIT (OUTPATIENT)
Facility: CLINIC | Age: 64
End: 2024-05-07
Payer: MEDICAID

## 2024-05-07 VITALS
HEIGHT: 70 IN | RESPIRATION RATE: 17 BRPM | SYSTOLIC BLOOD PRESSURE: 132 MMHG | BODY MASS INDEX: 34.19 KG/M2 | HEART RATE: 94 BPM | DIASTOLIC BLOOD PRESSURE: 73 MMHG | OXYGEN SATURATION: 95 % | WEIGHT: 238.8 LBS | TEMPERATURE: 97.8 F

## 2024-05-07 DIAGNOSIS — C77.8 MALIGNANT NEOPLASM OF PROSTATE METASTATIC TO LYMPH NODES OF MULTIPLE SITES (HCC): Primary | ICD-10-CM

## 2024-05-07 DIAGNOSIS — C61 MALIGNANT NEOPLASM OF PROSTATE METASTATIC TO LYMPH NODES OF MULTIPLE SITES (HCC): Primary | ICD-10-CM

## 2024-05-07 DIAGNOSIS — R60.0 BILATERAL LEG EDEMA: ICD-10-CM

## 2024-05-07 DIAGNOSIS — M1A.9XX0 CHRONIC GOUT WITHOUT TOPHUS, UNSPECIFIED CAUSE, UNSPECIFIED SITE: ICD-10-CM

## 2024-05-07 PROCEDURE — 3075F SYST BP GE 130 - 139MM HG: CPT | Performed by: NURSE PRACTITIONER

## 2024-05-07 PROCEDURE — 3078F DIAST BP <80 MM HG: CPT | Performed by: NURSE PRACTITIONER

## 2024-05-07 PROCEDURE — 99214 OFFICE O/P EST MOD 30 MIN: CPT | Performed by: NURSE PRACTITIONER

## 2024-05-07 RX ORDER — TRAMADOL HYDROCHLORIDE 50 MG/1
50 TABLET ORAL EVERY 8 HOURS PRN
Qty: 90 TABLET | Refills: 0 | Status: SHIPPED | OUTPATIENT
Start: 2024-05-07 | End: 2024-06-06

## 2024-05-07 SDOH — ECONOMIC STABILITY: INCOME INSECURITY: HOW HARD IS IT FOR YOU TO PAY FOR THE VERY BASICS LIKE FOOD, HOUSING, MEDICAL CARE, AND HEATING?: SOMEWHAT HARD

## 2024-05-07 SDOH — ECONOMIC STABILITY: FOOD INSECURITY: WITHIN THE PAST 12 MONTHS, YOU WORRIED THAT YOUR FOOD WOULD RUN OUT BEFORE YOU GOT MONEY TO BUY MORE.: SOMETIMES TRUE

## 2024-05-07 SDOH — ECONOMIC STABILITY: FOOD INSECURITY: WITHIN THE PAST 12 MONTHS, THE FOOD YOU BOUGHT JUST DIDN'T LAST AND YOU DIDN'T HAVE MONEY TO GET MORE.: SOMETIMES TRUE

## 2024-05-07 ASSESSMENT — PATIENT HEALTH QUESTIONNAIRE - PHQ9
SUM OF ALL RESPONSES TO PHQ QUESTIONS 1-9: 0
SUM OF ALL RESPONSES TO PHQ QUESTIONS 1-9: 0
2. FEELING DOWN, DEPRESSED OR HOPELESS: NOT AT ALL
SUM OF ALL RESPONSES TO PHQ QUESTIONS 1-9: 0
1. LITTLE INTEREST OR PLEASURE IN DOING THINGS: NOT AT ALL
SUM OF ALL RESPONSES TO PHQ QUESTIONS 1-9: 0
SUM OF ALL RESPONSES TO PHQ9 QUESTIONS 1 & 2: 0

## 2024-05-07 NOTE — PROGRESS NOTES
Chief Complaint   Patient presents with    Follow-up       \"Have you been to the ER, urgent care clinic since your last visit?  Hospitalized since your last visit?\"    NO    “Have you seen or consulted any other health care providers [facility] visit?”    NO

## 2024-05-07 NOTE — PROGRESS NOTES
History of Present Illness  George Wong is a 64 y.o. male who presents today for:    Chief Complaint   Patient presents with    Follow-up       Past Medical History  Past Medical History:   Diagnosis Date    Diabetes (HCC)     Elevated PSA     Gout     Gout     High cholesterol     Hyperlipemia     Hypertension     Pulmonary disease         Surgical History  Past Surgical History:   Procedure Laterality Date    COLONOSCOPY N/A 2021    COLONOSCOPY (TIVA) performed by Chon Reyna MD at [facility] ENDOSCOPY    OTHER SURGICAL HISTORY      cyst removed-foot        Current Medications  Current Outpatient Medications   Medication Sig    leuprolide (LUPRON DEPOT, 6-MONTH,) 45 MG injection Inject 45 mg into the muscle every 6 months    allopurinol (ZYLOPRIM) 100 MG tablet Take 2 tablets by mouth once daily    losartan (COZAAR) 100 MG tablet Take 1 tablet by mouth daily    bicalutamide (CASODEX) 50 MG chemo tablet Take 1 tablet by mouth daily    furosemide (LASIX) 20 MG tablet Take 1 tablet by mouth daily as needed (Take as needed for swelling in legs and feet)     No current facility-administered medications for this visit.       Allergies/Drug Reactions  Allergies   Allergen Reactions    Bee Venom Swelling        Family History  Family History   Problem Relation Age of Onset    Cancer Maternal Grandmother         colon    Cancer Maternal Grandfather         bone    Asthma Mother     Hypertension Sister         Social History  Social History     Tobacco Use    Smoking status: Former     Current packs/day: 0.00     Average packs/day: 0.5 packs/day for 30.0 years (15.0 ttl pk-yrs)     Types: Cigarettes     Start date: 3/1/1971     Quit date: 3/1/2001     Years since quittin.2    Smokeless tobacco: Never   Vaping Use    Vaping Use: Never used   Substance Use Topics    Alcohol use: Yes     Alcohol/week: 12.0 standard drinks of alcohol     Types: 12 Standard drinks or equivalent per week     Comment: weekly    Drug

## 2024-06-07 DIAGNOSIS — R60.0 BILATERAL LEG EDEMA: ICD-10-CM

## 2024-06-09 RX ORDER — FUROSEMIDE 20 MG/1
TABLET ORAL
Qty: 90 TABLET | Refills: 0 | Status: SHIPPED | OUTPATIENT
Start: 2024-06-09

## 2024-06-17 RX ORDER — ALLOPURINOL 100 MG/1
200 TABLET ORAL DAILY
Qty: 180 TABLET | Refills: 1 | Status: ON HOLD | OUTPATIENT
Start: 2024-06-17

## 2024-06-23 ENCOUNTER — APPOINTMENT (OUTPATIENT)
Age: 64
DRG: 720 | End: 2024-06-23
Payer: MEDICAID

## 2024-06-23 ENCOUNTER — HOSPITAL ENCOUNTER (INPATIENT)
Age: 64
LOS: 2 days | DRG: 720 | End: 2024-06-25
Attending: EMERGENCY MEDICINE | Admitting: INTERNAL MEDICINE
Payer: MEDICAID

## 2024-06-23 DIAGNOSIS — C79.51 PROSTATE CANCER METASTATIC TO BONE (HCC): ICD-10-CM

## 2024-06-23 DIAGNOSIS — R19.7 DIARRHEA, UNSPECIFIED TYPE: ICD-10-CM

## 2024-06-23 DIAGNOSIS — C61 PROSTATE CANCER METASTATIC TO BONE (HCC): ICD-10-CM

## 2024-06-23 DIAGNOSIS — A41.9 SEPSIS WITH ACUTE RENAL FAILURE AND SEPTIC SHOCK, DUE TO UNSPECIFIED ORGANISM, UNSPECIFIED ACUTE RENAL FAILURE TYPE (HCC): Primary | ICD-10-CM

## 2024-06-23 DIAGNOSIS — R65.21 SEPSIS WITH ACUTE RENAL FAILURE AND SEPTIC SHOCK, DUE TO UNSPECIFIED ORGANISM, UNSPECIFIED ACUTE RENAL FAILURE TYPE (HCC): Primary | ICD-10-CM

## 2024-06-23 DIAGNOSIS — R79.89 ELEVATED BRAIN NATRIURETIC PEPTIDE (BNP) LEVEL: ICD-10-CM

## 2024-06-23 DIAGNOSIS — R55 SYNCOPE AND COLLAPSE: ICD-10-CM

## 2024-06-23 DIAGNOSIS — N17.9 SEPSIS WITH ACUTE RENAL FAILURE AND SEPTIC SHOCK, DUE TO UNSPECIFIED ORGANISM, UNSPECIFIED ACUTE RENAL FAILURE TYPE (HCC): Primary | ICD-10-CM

## 2024-06-23 PROBLEM — R65.20 SEVERE SEPSIS (HCC): Status: ACTIVE | Noted: 2024-06-23

## 2024-06-23 PROBLEM — E87.1 HYPONATREMIA: Status: ACTIVE | Noted: 2024-06-23

## 2024-06-23 LAB
ALBUMIN SERPL-MCNC: 1.9 G/DL (ref 3.4–5)
ALBUMIN/GLOB SERPL: 0.4 (ref 0.8–1.7)
ALP SERPL-CCNC: 417 U/L (ref 45–117)
ALT SERPL-CCNC: 14 U/L (ref 16–61)
AMMONIA PLAS-SCNC: 43 UMOL/L (ref 11–32)
AMPHET UR QL SCN: NEGATIVE
ANION GAP SERPL CALC-SCNC: 14 MMOL/L (ref 3–18)
ANION GAP SERPL CALC-SCNC: 21 MMOL/L (ref 3–18)
APPEARANCE UR: ABNORMAL
AST SERPL W P-5'-P-CCNC: 197 U/L (ref 10–38)
BACTERIA URNS QL MICRO: ABNORMAL /HPF
BARBITURATES UR QL SCN: NEGATIVE
BASOPHILS # BLD: 0 K/UL (ref 0–0.1)
BASOPHILS NFR BLD: 0 % (ref 0–2)
BENZODIAZ UR QL: NEGATIVE
BILIRUB SERPL-MCNC: 1 MG/DL (ref 0.2–1)
BILIRUB UR QL: NEGATIVE
BNP SERPL-MCNC: ABNORMAL PG/ML (ref 0–900)
BUN SERPL-MCNC: 25 MG/DL (ref 7–18)
BUN SERPL-MCNC: 28 MG/DL (ref 7–18)
BUN/CREAT SERPL: 14 (ref 12–20)
BUN/CREAT SERPL: 24 (ref 12–20)
C DIFF GDH STL QL: NEGATIVE
C DIFF TOX A+B STL QL IA: NEGATIVE
C DIFF TOXIN INTERPRETATION: NORMAL
CA-I BLD-MCNC: 8.4 MG/DL (ref 8.5–10.1)
CA-I BLD-MCNC: 8.8 MG/DL (ref 8.5–10.1)
CANNABINOIDS UR QL SCN: NEGATIVE
CHLORIDE SERPL-SCNC: 86 MMOL/L (ref 100–111)
CHLORIDE SERPL-SCNC: 86 MMOL/L (ref 100–111)
CO2 SERPL-SCNC: 16 MMOL/L (ref 21–32)
CO2 SERPL-SCNC: 22 MMOL/L (ref 21–32)
COCAINE UR QL SCN: NEGATIVE
COLOR UR: YELLOW
CREAT SERPL-MCNC: 1.16 MG/DL (ref 0.6–1.3)
CREAT SERPL-MCNC: 1.81 MG/DL (ref 0.6–1.3)
DIFFERENTIAL METHOD BLD: ABNORMAL
EOSINOPHIL # BLD: 0 K/UL (ref 0–0.4)
EOSINOPHIL NFR BLD: 0 % (ref 0–5)
EPITH CASTS URNS QL MICRO: ABNORMAL /LPF (ref 0–20)
ERYTHROCYTE [DISTWIDTH] IN BLOOD BY AUTOMATED COUNT: 16.2 % (ref 11.6–14.5)
ETHANOL SERPL-MCNC: <3 MG/DL (ref 0–3)
FENTANYL UR QL SCN: NEGATIVE
FLUAV RNA SPEC QL NAA+PROBE: NOT DETECTED
FLUBV RNA SPEC QL NAA+PROBE: NOT DETECTED
GLOBULIN SER CALC-MCNC: 4.5 G/DL (ref 2–4)
GLUCOSE BLD STRIP.AUTO-MCNC: 133 MG/DL (ref 70–110)
GLUCOSE BLD STRIP.AUTO-MCNC: 81 MG/DL (ref 70–110)
GLUCOSE SERPL-MCNC: 125 MG/DL (ref 74–99)
GLUCOSE SERPL-MCNC: 95 MG/DL (ref 74–99)
GLUCOSE UR STRIP.AUTO-MCNC: NEGATIVE MG/DL
GRAN CASTS URNS QL MICRO: ABNORMAL /LPF
HCT VFR BLD AUTO: 23.6 % (ref 36–48)
HGB BLD-MCNC: 7.5 G/DL (ref 13–16)
HGB UR QL STRIP: ABNORMAL
IMM GRANULOCYTES # BLD AUTO: 0 K/UL
IMM GRANULOCYTES NFR BLD AUTO: 0 %
KETONES UR QL STRIP.AUTO: NEGATIVE MG/DL
LACTATE SERPL-SCNC: 1.1 MMOL/L (ref 0.4–2)
LACTATE SERPL-SCNC: 3.5 MMOL/L (ref 0.4–2)
LACTATE SERPL-SCNC: 7.6 MMOL/L (ref 0.4–2)
LEUKOCYTE ESTERASE UR QL STRIP.AUTO: NEGATIVE
LYMPHOCYTES # BLD: 2.3 K/UL (ref 0.9–3.6)
LYMPHOCYTES NFR BLD: 36 % (ref 21–52)
Lab: NORMAL
MCH RBC QN AUTO: 27.6 PG (ref 24–34)
MCHC RBC AUTO-ENTMCNC: 31.8 G/DL (ref 31–37)
MCV RBC AUTO: 86.8 FL (ref 78–100)
METAMYELOCYTES NFR BLD MANUAL: 1 %
METHADONE UR QL: NEGATIVE
MONOCYTES # BLD: 0.9 K/UL (ref 0.05–1.2)
MONOCYTES NFR BLD: 14 % (ref 3–10)
MYELOCYTES NFR BLD MANUAL: 3 %
NEUTS BAND NFR BLD MANUAL: 8 % (ref 0–5)
NEUTS SEG # BLD: 2.9 K/UL (ref 1.8–8)
NEUTS SEG NFR BLD: 37 % (ref 40–73)
NITRITE UR QL STRIP.AUTO: NEGATIVE
NRBC # BLD: 0.32 K/UL (ref 0–0.01)
NRBC # BLD: 0.39 K/UL
NRBC BLD MANUAL-RTO: 6 PER 100 WBC
NRBC BLD-RTO: 4.6 PER 100 WBC
OPIATES UR QL: NEGATIVE
OXYCODONE UR QL SCN: NEGATIVE
PCP UR QL: NEGATIVE
PERFORMED BY:: ABNORMAL
PERFORMED BY:: NORMAL
PH UR STRIP: 5.5 (ref 5–8)
PLATELET # BLD AUTO: 171 K/UL (ref 135–420)
PLATELET COMMENT: ABNORMAL
PMV BLD AUTO: 9.6 FL (ref 9.2–11.8)
POTASSIUM SERPL-SCNC: 3.1 MMOL/L (ref 3.5–5.5)
POTASSIUM SERPL-SCNC: 4.2 MMOL/L (ref 3.5–5.5)
PROCALCITONIN SERPL-MCNC: 60.52 NG/ML
PROMYELOCYTES NFR BLD MANUAL: 1 %
PROPOXYPH UR QL: NEGATIVE
PROT SERPL-MCNC: 6.4 G/DL (ref 6.4–8.2)
PROT UR STRIP-MCNC: 100 MG/DL
RBC # BLD AUTO: 2.72 M/UL (ref 4.35–5.65)
RBC #/AREA URNS HPF: ABNORMAL /HPF (ref 0–2)
RBC MORPH BLD: ABNORMAL
SARS-COV-2 RNA RESP QL NAA+PROBE: NOT DETECTED
SODIUM SERPL-SCNC: 122 MMOL/L (ref 136–145)
SODIUM SERPL-SCNC: 123 MMOL/L (ref 136–145)
SP GR UR REFRACTOMETRY: 1.02 (ref 1–1.03)
TRICYCLICS UR QL: NEGATIVE
TSH SERPL DL<=0.05 MIU/L-ACNC: 5.94 UIU/ML (ref 0.36–3.74)
UROBILINOGEN UR QL STRIP.AUTO: 1 EU/DL (ref 0.2–1)
WBC # BLD AUTO: 6.5 K/UL (ref 4.6–13.2)
WBC URNS QL MICRO: ABNORMAL /HPF (ref 0–4)

## 2024-06-23 PROCEDURE — 51701 INSERT BLADDER CATHETER: CPT

## 2024-06-23 PROCEDURE — 83880 ASSAY OF NATRIURETIC PEPTIDE: CPT

## 2024-06-23 PROCEDURE — 99291 CRITICAL CARE FIRST HOUR: CPT

## 2024-06-23 PROCEDURE — 96368 THER/DIAG CONCURRENT INF: CPT

## 2024-06-23 PROCEDURE — 6360000002 HC RX W HCPCS: Performed by: EMERGENCY MEDICINE

## 2024-06-23 PROCEDURE — 86850 RBC ANTIBODY SCREEN: CPT

## 2024-06-23 PROCEDURE — 86901 BLOOD TYPING SEROLOGIC RH(D): CPT

## 2024-06-23 PROCEDURE — 0BH17EZ INSERTION OF ENDOTRACHEAL AIRWAY INTO TRACHEA, VIA NATURAL OR ARTIFICIAL OPENING: ICD-10-PCS | Performed by: EMERGENCY MEDICINE

## 2024-06-23 PROCEDURE — 87324 CLOSTRIDIUM AG IA: CPT

## 2024-06-23 PROCEDURE — 87040 BLOOD CULTURE FOR BACTERIA: CPT

## 2024-06-23 PROCEDURE — 02HV33Z INSERTION OF INFUSION DEVICE INTO SUPERIOR VENA CAVA, PERCUTANEOUS APPROACH: ICD-10-PCS | Performed by: EMERGENCY MEDICINE

## 2024-06-23 PROCEDURE — 51798 US URINE CAPACITY MEASURE: CPT

## 2024-06-23 PROCEDURE — 36556 INSERT NON-TUNNEL CV CATH: CPT

## 2024-06-23 PROCEDURE — 81001 URINALYSIS AUTO W/SCOPE: CPT

## 2024-06-23 PROCEDURE — 82962 GLUCOSE BLOOD TEST: CPT

## 2024-06-23 PROCEDURE — 2500000003 HC RX 250 WO HCPCS: Performed by: EMERGENCY MEDICINE

## 2024-06-23 PROCEDURE — 6360000002 HC RX W HCPCS: Performed by: NURSE PRACTITIONER

## 2024-06-23 PROCEDURE — 2000000000 HC ICU R&B

## 2024-06-23 PROCEDURE — 74018 RADEX ABDOMEN 1 VIEW: CPT

## 2024-06-23 PROCEDURE — 82077 ASSAY SPEC XCP UR&BREATH IA: CPT

## 2024-06-23 PROCEDURE — 96365 THER/PROPH/DIAG IV INF INIT: CPT

## 2024-06-23 PROCEDURE — 74176 CT ABD & PELVIS W/O CONTRAST: CPT

## 2024-06-23 PROCEDURE — 82140 ASSAY OF AMMONIA: CPT

## 2024-06-23 PROCEDURE — 84443 ASSAY THYROID STIM HORMONE: CPT

## 2024-06-23 PROCEDURE — 87636 SARSCOV2 & INF A&B AMP PRB: CPT

## 2024-06-23 PROCEDURE — 84145 PROCALCITONIN (PCT): CPT

## 2024-06-23 PROCEDURE — 80048 BASIC METABOLIC PNL TOTAL CA: CPT

## 2024-06-23 PROCEDURE — 2580000003 HC RX 258: Performed by: NURSE PRACTITIONER

## 2024-06-23 PROCEDURE — 36415 COLL VENOUS BLD VENIPUNCTURE: CPT

## 2024-06-23 PROCEDURE — 80053 COMPREHEN METABOLIC PANEL: CPT

## 2024-06-23 PROCEDURE — 87449 NOS EACH ORGANISM AG IA: CPT

## 2024-06-23 PROCEDURE — 83605 ASSAY OF LACTIC ACID: CPT

## 2024-06-23 PROCEDURE — 70450 CT HEAD/BRAIN W/O DYE: CPT

## 2024-06-23 PROCEDURE — 96366 THER/PROPH/DIAG IV INF ADDON: CPT

## 2024-06-23 PROCEDURE — 71045 X-RAY EXAM CHEST 1 VIEW: CPT

## 2024-06-23 PROCEDURE — 3E033XZ INTRODUCTION OF VASOPRESSOR INTO PERIPHERAL VEIN, PERCUTANEOUS APPROACH: ICD-10-PCS | Performed by: EMERGENCY MEDICINE

## 2024-06-23 PROCEDURE — 85025 COMPLETE CBC W/AUTO DIFF WBC: CPT

## 2024-06-23 PROCEDURE — 93005 ELECTROCARDIOGRAM TRACING: CPT | Performed by: EMERGENCY MEDICINE

## 2024-06-23 PROCEDURE — 80307 DRUG TEST PRSMV CHEM ANLYZR: CPT

## 2024-06-23 PROCEDURE — 86900 BLOOD TYPING SEROLOGIC ABO: CPT

## 2024-06-23 PROCEDURE — 6370000000 HC RX 637 (ALT 250 FOR IP): Performed by: NURSE PRACTITIONER

## 2024-06-23 PROCEDURE — 94761 N-INVAS EAR/PLS OXIMETRY MLT: CPT

## 2024-06-23 PROCEDURE — 2580000003 HC RX 258: Performed by: EMERGENCY MEDICINE

## 2024-06-23 RX ORDER — SODIUM CHLORIDE 9 MG/ML
INJECTION, SOLUTION INTRAVENOUS CONTINUOUS
Status: DISCONTINUED | OUTPATIENT
Start: 2024-06-23 | End: 2024-06-23

## 2024-06-23 RX ORDER — FERROUS SULFATE 325(65) MG
325 TABLET ORAL
COMMUNITY

## 2024-06-23 RX ORDER — LOPERAMIDE HYDROCHLORIDE 2 MG/1
2 CAPSULE ORAL 4 TIMES DAILY PRN
Status: DISCONTINUED | OUTPATIENT
Start: 2024-06-23 | End: 2024-06-26 | Stop reason: HOSPADM

## 2024-06-23 RX ORDER — NOREPINEPHRINE BITARTRATE 1 MG/ML
INJECTION, SOLUTION INTRAVENOUS
Status: DISPENSED
Start: 2024-06-23 | End: 2024-06-24

## 2024-06-23 RX ORDER — ONDANSETRON 4 MG/1
4 TABLET, ORALLY DISINTEGRATING ORAL EVERY 8 HOURS PRN
Status: DISCONTINUED | OUTPATIENT
Start: 2024-06-23 | End: 2024-06-26 | Stop reason: HOSPADM

## 2024-06-23 RX ORDER — ENOXAPARIN SODIUM 100 MG/ML
40 INJECTION SUBCUTANEOUS DAILY
Status: DISCONTINUED | OUTPATIENT
Start: 2024-06-23 | End: 2024-06-24

## 2024-06-23 RX ORDER — SODIUM CHLORIDE 0.9 % (FLUSH) 0.9 %
5-40 SYRINGE (ML) INJECTION EVERY 12 HOURS SCHEDULED
Status: DISCONTINUED | OUTPATIENT
Start: 2024-06-23 | End: 2024-06-26 | Stop reason: HOSPADM

## 2024-06-23 RX ORDER — ACETAMINOPHEN 650 MG/1
650 SUPPOSITORY RECTAL EVERY 6 HOURS PRN
Status: DISCONTINUED | OUTPATIENT
Start: 2024-06-23 | End: 2024-06-26 | Stop reason: HOSPADM

## 2024-06-23 RX ORDER — SODIUM CHLORIDE 9 MG/ML
INJECTION, SOLUTION INTRAVENOUS CONTINUOUS
Status: DISCONTINUED | OUTPATIENT
Start: 2024-06-23 | End: 2024-06-26 | Stop reason: HOSPADM

## 2024-06-23 RX ORDER — ONDANSETRON 2 MG/ML
4 INJECTION INTRAMUSCULAR; INTRAVENOUS EVERY 6 HOURS PRN
Status: DISCONTINUED | OUTPATIENT
Start: 2024-06-23 | End: 2024-06-26 | Stop reason: HOSPADM

## 2024-06-23 RX ORDER — FERROUS SULFATE 325(65) MG
325 TABLET ORAL
Status: DISCONTINUED | OUTPATIENT
Start: 2024-06-24 | End: 2024-06-24

## 2024-06-23 RX ORDER — SODIUM CHLORIDE 0.9 % (FLUSH) 0.9 %
5-40 SYRINGE (ML) INJECTION PRN
Status: DISCONTINUED | OUTPATIENT
Start: 2024-06-23 | End: 2024-06-26 | Stop reason: HOSPADM

## 2024-06-23 RX ORDER — ACETAMINOPHEN 325 MG/1
650 TABLET ORAL EVERY 6 HOURS PRN
Status: DISCONTINUED | OUTPATIENT
Start: 2024-06-23 | End: 2024-06-26 | Stop reason: HOSPADM

## 2024-06-23 RX ORDER — TRAMADOL HYDROCHLORIDE 50 MG/1
50 TABLET ORAL EVERY 6 HOURS PRN
Status: DISCONTINUED | OUTPATIENT
Start: 2024-06-23 | End: 2024-06-26 | Stop reason: HOSPADM

## 2024-06-23 RX ORDER — POLYETHYLENE GLYCOL 3350 17 G/17G
17 POWDER, FOR SOLUTION ORAL DAILY PRN
Status: DISCONTINUED | OUTPATIENT
Start: 2024-06-23 | End: 2024-06-26 | Stop reason: HOSPADM

## 2024-06-23 RX ADMIN — TRAMADOL HYDROCHLORIDE 50 MG: 50 TABLET ORAL at 20:00

## 2024-06-23 RX ADMIN — PIPERACILLIN AND TAZOBACTAM 4500 MG: 4; .5 INJECTION, POWDER, LYOPHILIZED, FOR SOLUTION INTRAVENOUS; PARENTERAL at 13:02

## 2024-06-23 RX ADMIN — SODIUM CHLORIDE, PRESERVATIVE FREE 5 ML: 5 INJECTION INTRAVENOUS at 22:07

## 2024-06-23 RX ADMIN — ENOXAPARIN SODIUM 40 MG: 100 INJECTION SUBCUTANEOUS at 16:27

## 2024-06-23 RX ADMIN — SODIUM CHLORIDE: 9 INJECTION, SOLUTION INTRAVENOUS at 22:00

## 2024-06-23 RX ADMIN — SODIUM CHLORIDE: 9 INJECTION, SOLUTION INTRAVENOUS at 14:04

## 2024-06-23 RX ADMIN — NOREPINEPHRINE BITARTRATE 5 MCG/MIN: 1 INJECTION, SOLUTION, CONCENTRATE INTRAVENOUS at 13:22

## 2024-06-23 RX ADMIN — SODIUM CHLORIDE: 9 INJECTION, SOLUTION INTRAVENOUS at 12:18

## 2024-06-23 RX ADMIN — PIPERACILLIN AND TAZOBACTAM 4500 MG: 4; .5 INJECTION, POWDER, LYOPHILIZED, FOR SOLUTION INTRAVENOUS; PARENTERAL at 18:30

## 2024-06-23 RX ADMIN — VANCOMYCIN HYDROCHLORIDE 2000 MG: 1 INJECTION, POWDER, LYOPHILIZED, FOR SOLUTION INTRAVENOUS at 13:56

## 2024-06-23 ASSESSMENT — PAIN SCALES - GENERAL
PAINLEVEL_OUTOF10: 0
PAINLEVEL_OUTOF10: 0
PAINLEVEL_OUTOF10: 5
PAINLEVEL_OUTOF10: 0

## 2024-06-23 ASSESSMENT — PAIN SCALES - PAIN ASSESSMENT IN ADVANCED DEMENTIA (PAINAD)
CONSOLABILITY: NO NEED TO CONSOLE
BREATHING: NORMAL
FACIALEXPRESSION: SMILING OR INEXPRESSIVE
TOTALSCORE: 0
BODYLANGUAGE: RELAXED

## 2024-06-23 ASSESSMENT — LIFESTYLE VARIABLES
HOW OFTEN DO YOU HAVE A DRINK CONTAINING ALCOHOL: NEVER
HOW MANY STANDARD DRINKS CONTAINING ALCOHOL DO YOU HAVE ON A TYPICAL DAY: PATIENT DOES NOT DRINK

## 2024-06-23 ASSESSMENT — PAIN DESCRIPTION - DESCRIPTORS: DESCRIPTORS: ACHING

## 2024-06-23 ASSESSMENT — PAIN - FUNCTIONAL ASSESSMENT: PAIN_FUNCTIONAL_ASSESSMENT: ACTIVITIES ARE NOT PREVENTED

## 2024-06-23 ASSESSMENT — PAIN DESCRIPTION - LOCATION: LOCATION: LEG

## 2024-06-23 ASSESSMENT — PAIN DESCRIPTION - ORIENTATION: ORIENTATION: RIGHT

## 2024-06-23 NOTE — H&P
History and Physical    Subjective:     George Wong is a 64 y.o. -American male with a past medical history of metastatic prostate cancer mets to bone, hypertension, diabetes mellitus (diet controlled), intellectual disability, and gout, patient presents to the ED after being found unresponsive by his mother. Patient reports he was taking his bath and became severely weak, patient is unsure if he loss consciousness, mother reports patient staring into space and became arousal while EMS was in the house. Patient mother at the bedside states ongoing weakness since his diagnosis of cancer in 10/23, other accompanying symptoms includes worsening weakness, diarrhea, and chills. Patient denies fevers, palpitations, chest pain, shortness of breath, nausea/vomiting, and abdominal pain. Mother reports that patient was recently at [facility] on Friday where he received 1 unit of blood, and had an body scan performed which shown metastatic cancer to the bone. In the ED labs and imaging reviewed and shown a sodium of 123, BUN 25, creatinine 1.81, lactic acid 7.6, procalcitonin 60.52, BNP 18,190, ammonia 43, alkaline phosphatase 417, , TSH 5.94, no leukocytosis, H&H 7.5/23.6, 8 band neutrophils, CT of the head did not reveal any intra cranial abnormalities, CT of his abdomen/pelvis showed fluid in the colon compatible with diarrhea but there is no bowel obstructions no abscesses it did show decreased small bilateral pleural effusions, chest x-ray did not show any acute pulmonary processes but did show the multiple osseous metastasis, patient tachycardic and tachypneic, and systolic blood pressure in the 50s while in the ED.  In the ED sepsis alert was called patient did receive 2 L of normal saline, was started on Zosyn and vancomycin, and for blood pressure to support Levophed drip was started.  ED provider did consult with patient oncologist Dr. Jimenez, to see if patient needs to be  adrenal hypertrophy. KIDNEYS/URETERS: No calculus or hydronephrosis. STOMACH: Unremarkable. SMALL BOWEL: No obstruction. COLON: Rectal tube. Lumen contains fluid. Mild diverticulosis. APPENDIX: Normal. PERITONEUM: No ascites or pneumoperitoneum. RETROPERITONEUM: Resolution of lymphadenopathy. Subtle non-mass-like soft tissue is around the distal aorta and aortic bifurcation. REPRODUCTIVE ORGANS: Prostate gland is not enlarged. URINARY BLADDER: No mass or calculus. BONES: Extensive sclerotic osseous disease is not significantly changed. No pathologic fracture. ABDOMINAL WALL: No mass or hernia. ADDITIONAL COMMENTS: Left external iliac lymph node measures 2 cm in short axis. Other pelvic lymphadenopathy has normalized in size.     1. Near complete resolution of lymphadenopathy. Only persistent abnormal lymph node is left external iliac. 2. Fluid in the colon is compatible with diarrhea. No bowel obstruction. No abscess. 3. Decreased small bilateral pleural effusions. 4. Unchanged sclerotic osseous disease. Electronically signed by Naresh Foreman    CT Head W/O Contrast    Result Date: 6/23/2024  EXAM: CT HEAD WO CONTRAST INDICATION: Acute mental status change. Loss of consciousness. COMPARISON: None. CONTRAST: None. TECHNIQUE: Unenhanced CT of the head was performed using 5 mm images. Brain and bone windows were generated. Coronal and sagittal reformats. CT dose reduction was achieved through use of a standardized protocol tailored for this examination and automatic exposure control for dose modulation.  FINDINGS: The ventricles and sulci are normal in size, shape and configuration. There is no significant white matter disease. There is no intracranial hemorrhage, extra-axial collection, or mass effect. The basilar cisterns are open. No CT evidence of acute infarct. The bone windows demonstrate increased density. The visualized portions of the paranasal sinuses and mastoid air cells are remarkable for right maxillary 2  other interpretive errors are inadvertently transcribed by the computer software.  Please disregard these errors.  Please excuse any errors that have escaped final proofreading.  Thank you.

## 2024-06-23 NOTE — ED NOTES
Large lose dark green bowel movement noted at this time. Patient incontinent of stool. States he still produces urine

## 2024-06-23 NOTE — PROGRESS NOTES
[facility]   Pharmacy Pharmacokinetic Monitoring Service - Vancomycin     George Wong is a 64 y.o. male starting on vancomycin therapy for sepsis of unknown etiology. Pharmacy consulted by Dr. Desir for monitoring and adjustment.    Target Concentration: Goal AUC/HYACINTH 400-600 mg*hr/L    Additional Antimicrobials: Zosyn x 1 in ED    Pertinent Laboratory Values:   Wt Readings from Last 1 Encounters:   06/23/24 99.8 kg (220 lb)     Temp Readings from Last 1 Encounters:   06/23/24 98.1 °F (36.7 °C) (Rectal)     Estimated Creatinine Clearance: 50 mL/min (A) (based on SCr of 1.81 mg/dL (H)).  Recent Labs     06/23/24  1213   CREATININE 1.81*   BUN 25*   WBC 6.5     Plan:  Dosing recommendations based on Bayesian software  Vancomycin 2000 mg IV x 1 in ED, followed by Vancomycin 1250 mg IV q24h  Anticipated AUC of 506 mg/l.h and trough concentration of 14.7 mg/l at steady state  Renal labs as indicated   Pharmacy will continue to monitor patient and adjust therapy as indicated    VISHNU ACOSTA RPH, BCPS   6/23/2024 2:40 PM

## 2024-06-23 NOTE — ED TRIAGE NOTES
Per EMS, called out for \"sick person\" upon arrival, patient was sitting up in chair in bathroom, unresponsive, mother was trying to clean patient up. Per EMS, pancreatic cancer that has metastases to bones. Upon transferring to ambulance, patient became arousal when in ambulance. Complains of pain in upper legs, that's normal per patient.

## 2024-06-23 NOTE — ASSESSMENT & PLAN NOTE
-likely secondary to hypovolemia, patient with diarrhea  -sodium 123, will continue IVF for now  -repeat sodium in 6 hours  -BMP in the am

## 2024-06-23 NOTE — ASSESSMENT & PLAN NOTE
-chronic  -patient follow [facility]  -patient currently on palliative oral chemotherapy (Casodex)

## 2024-06-23 NOTE — ED PROVIDER NOTES
F 2 ICU  EMERGENCY DEPARTMENT ENCOUNTER       Pt Name: George Wong  MRN: [id number]  Birthdate 1960  Date of evaluation: 6/23/2024  Provider: Lizeth Desir MD   PCP: Tanner Eaton, BRYAN - NP  Note Started: 7:12 PM 6/23/24     CHIEF COMPLAINT       Chief Complaint   Patient presents with    Loss of Consciousness        HISTORY OF PRESENT ILLNESS: 1 or more elements      History From: Patient and Patient's Brother  History limited by: Annabel historian     George Wong is a 64 y.o. male who presents to the ED via EMS called at scene for patient being unresponsive.  EMS reported patient with history of prostate cancer with mets to the bone and were called by patient's mother who was at scene.  Patient lives with sister but mother is primary caregiver and she reports patient with declining health over the last few months due to metastatic pancreatic cancer and patient is followed by [facility], his chemotherapy at home and gets a shot every month.  Mother reports when she got to patient's residence he he appeared very weak but was able to ambulate with walker to bathroom.  He had large diarrheal stool and when mother was attempting to clean patient up she reports he passed out prompting her to call EMS.  Mother aware of patient's poor prognosis from his cancer but is wanting everything possible done.  Patient is lethargic but able to give history and denies any pain.               Nursing Notes were all reviewed and agreed with or any disagreements were addressed in the HPI.     REVIEW OF SYSTEMS      Review of Systems     Positives and Pertinent negatives as per HPI.    PAST HISTORY     Past Medical History:  Past Medical History:   Diagnosis Date    Bone cancer (HCC)     Diabetes (HCC)     Elevated PSA     Gout     Gout     High cholesterol     Hyperlipemia     Hypertension     prostate cancer     Pulmonary disease        Past Surgical History:  Past Surgical History:   Procedure  studies.      CC/HPI Summary, DDx, ED Course, and Reassessment:     Patient with past medical history significant for morbid obesity, gout, diabetes, intellectual disability, metastatic prostate cancer mostly to the bones and presents via EMS called by mother due to altered mental status but on further exam.  Head syncope episode.  Mother also describes diarrhea beginning this morning.  Patient lethargic at ED arrival but able to give some history and denies any pain.  He met sepsis criteria at arrival and this was called and sepsis protocol initiated however on review of his past medical history he was admitted in this facility with bilateral pleural effusions pedal edema-suspected CHF however there was no echo results noted.  He still has mild pedal edema, lungs head few crackles in the bases and I suspected possible ongoing CHF thus limited his IV fluids to 2 L which he tolerated well with slight improvement of his blood pressure. BNP also elevated.  Patient further evaluated with CT scan due to the altered mental status and syncope, EKG which showed some tachycardia, chest x-ray shows no pneumonia, CBC, CMP, troponin, blood cultures, urine, lactic acid and Zosyn and vancomycin initiated.  He did have 2 episodes of diarrhea while in ED and stool was sent for C. difficile.  I favor consulted with patient's oncologist and I discussed with Dr. Hastings who advised patient can be admitted here but if necessity for transfer okay is should be transferred to hospitalist service.  Discussed with hospitalist, Ida SULLIVAN, patient will be admitted here for management of his sepsis.  I attempted DNR with mother who still is wanting everything done for this patient although she is aware of his poor prognosis.          Disposition Considerations (Tests not done, Shared Decision Making, Pt Expectation of Test or Tx.):      FINAL IMPRESSION     1. Sepsis with acute renal failure and septic shock, due to unspecified organism,  unspecified acute renal failure type (HCC)    2. Syncope and collapse    3. Diarrhea, unspecified type    4. Prostate cancer metastatic to bone (HCC)    5. Elevated brain natriuretic peptide (BNP) level         DISPOSITION/PLAN   DISPOSITION Admitted 06/23/2024 03:05:03 PM      Admitted     PATIENT REFERRED TO:  No follow-up provider specified.     DISCHARGE MEDICATIONS:  Current Discharge Medication List             Details   ferrous sulfate (IRON 325) 325 (65 Fe) MG tablet Take 1 tablet by mouth daily (with breakfast)      allopurinol (ZYLOPRIM) 100 MG tablet Take 2 tablets by mouth once daily  Qty: 180 tablet, Refills: 1      furosemide (LASIX) 20 MG tablet TAKE 1 TABLET BY MOUTH ONCE DAILY AS NEEDED FOR  SWELLING  IN  THE  LEGS  AND  FEET  Qty: 90 tablet, Refills: 0    Associated Diagnoses: Bilateral leg edema      leuprolide (LUPRON DEPOT, 6-MONTH,) 45 MG injection Inject 45 mg into the muscle every 6 months  Qty: 1 each, Refills: 1      losartan (COZAAR) 100 MG tablet Take 1 tablet by mouth daily      bicalutamide (CASODEX) 50 MG chemo tablet Take 1 tablet by mouth daily  Qty: 90 tablet, Refills: 3    Associated Diagnoses: Prostate cancer metastatic to bone (HCC); Malignant neoplasm of prostate metastatic to lymph nodes of multiple sites (HCC)             DISCONTINUED MEDICATIONS:  Current Discharge Medication List          I am the Primary Clinician of Record.   Lizeth Desir MD (electronically signed)    (Please note that parts of this dictation were completed with voice recognition software. Quite often unanticipated grammatical, syntax, homophones, and other interpretive errors are inadvertently transcribed by the computer software. Please disregards these errors. Please excuse any errors that have escaped final proofreading.)        Lizeth Desir MD  06/23/24 1929

## 2024-06-23 NOTE — ASSESSMENT & PLAN NOTE
-prerenal,likely secondary to dehydration associated with diarrhea  -Cr. On admission 1.8 and BUN 25, baseline Cr 0.5  -start  ml/hr  -strict I/O  -renal panel daily, monitor K+ and Phos levels closely  -Avoid nephrotoxic agents. No NSAIDS, No ACEI/ARBs. No Diuretics (holding Losartan)  -avoid lowering BP drastically  -monitor for fluid overload

## 2024-06-23 NOTE — PLAN OF CARE
Problem: Discharge Planning  Goal: Discharge to home or other facility with appropriate resources  Outcome: Progressing     Problem: Safety - Adult  Goal: Free from fall injury  Outcome: Progressing     Problem: Skin/Tissue Integrity  Goal: Absence of new skin breakdown  Description: 1.  Monitor for areas of redness and/or skin breakdown  2.  Assess vascular access sites hourly  3.  Every 4-6 hours minimum:  Change oxygen saturation probe site  4.  Every 4-6 hours:  If on nasal continuous positive airway pressure, respiratory therapy assess nares and determine need for appliance change or resting period.  Outcome: Progressing     Problem: ABCDS Injury Assessment  Goal: Absence of physical injury  Outcome: Progressing     Problem: Chronic Conditions and Co-morbidities  Goal: Patient's chronic conditions and co-morbidity symptoms are monitored and maintained or improved  Outcome: Progressing     Problem: Pain  Goal: Verbalizes/displays adequate comfort level or baseline comfort level  Outcome: Progressing

## 2024-06-23 NOTE — PROGRESS NOTES
1530- ICU room 4 set up for admission from ER.    TRANSFER - IN REPORT:    Verbal report received from Judy MANNING on George Wong  being received from ER for change in patient condition (Hypotension - Levophed)  - CVC in place right femoral area, FMS in place. C - diff stool culture negative.     Report consisted of patient's Situation, Background, Assessment and   Recommendations(SBAR).     Information from the following report(s) Nurse Handoff Report, Index, ED Encounter Summary, ED SBAR, Adult Overview, Intake/Output, Recent Results, Med Rec Status, Cardiac Rhythm ST, Alarm Parameters, Pre Procedure Checklist, and Event Log was reviewed with the receiving nurse.  Opportunity for questions and clarification was provided.      1615- Assessment completed upon patient's arrival to unit and care assumed.- see flowsheet. Mother at bedside, assisting with patient care.  Wound documentation done - see avatar.    1800- Straight cath done - urinary retention - scanned 480 ml of urine - tolerated well - UA/drug screen send to lab, incontinence care done, repositioned, call bell in reach, bed alarm on.

## 2024-06-23 NOTE — FLOWSHEET NOTE
06/23/24 1800   Assessment   Charting Type Reassessment   Reassessment Performed Changes documented below   Cardiac   Cardiac Rhythm Sinus tachy   Rhythm Interpretation   Pulse (!) 103   Genitourinary   Genitourinary (WDL) X   Suprapubic Tenderness No   Dysuria (Pain/Burning w/Urination) No   Difficulty Urinating/Starting Stream Yes   Urine Frequency   Urine Frequency No   Urine Urgency   Urine Urgency No   Urinary Retention   Urinary Retention Present   Urine Assessment   Urinary Status Straight cath;Has not voided   Urinary Incontinence Absent   Urine Color Jelly   Urine Appearance Clear   Urine Odor No odor   Bladder Scan Volume (mL) 480 mL   $ Bladder scan $ Yes   $ Cath urethra straight $ Yes   Post Void Cath Residual (mL) 10   Perineal Sensation   Perineal Sensation Intact   Care Plan - Genitourinary Goals    Absence of urinary retention Assess patient’s ability to void and empty bladder;Monitor intake/output and perform bladder scan as needed

## 2024-06-23 NOTE — ED NOTES
Mother at bedside, states he had a blood transfusion on Friday, gave him a iron pill yesterday and today.

## 2024-06-23 NOTE — ED NOTES
TRANSFER - OUT REPORT:    Verbal report given to NIGEL Torres and Aylin RN on George Wong  being transferred to ICU for routine progression of patient care       Report consisted of patient's Situation, Background, Assessment and   Recommendations(SBAR).     Information from the following report(s) Nurse Handoff Report, ED Encounter Summary, ED SBAR, MAR, and Recent Results was reviewed with the receiving nurse.    [place] Fall Assessment:    Presents to emergency department  because of falls (Syncope, seizure, or loss of consciousness): No  Age > 70: No  Altered Mental Status, Intoxication with alcohol or substance confusion (Disorientation, impaired judgment, poor safety awaremess, or inability to follow instructions): Yes  Impaired Mobility: Ambulates or transfers with assistive devices or assistance; Unable to ambulate or transer.: Yes  Nursing Judgement: No          Lines:   CVC  06/23/24 Right Femoral (Active)   $Central Line Insertion $ Yes 06/23/24 1226   Central Line Being Utilized Yes 06/23/24 1226   Criteria for Appropriate Use Hemodynamically unstable, requiring monitoring lines, vasopressors, or volume resuscitation 06/23/24 1226   Site Assessment Clean, dry & intact 06/23/24 1226       Peripheral IV 06/23/24 Right Antecubital (Active)       Peripheral IV 06/23/24 Left Forearm (Active)   Site Assessment Clean, dry & intact 06/23/24 1216   Line Status Blood return noted 06/23/24 1216   Phlebitis Assessment No symptoms 06/23/24 1216   Infiltration Assessment 0 06/23/24 1216        Opportunity for questions and clarification was provided.      Patient transported with:  Monitor and Registered Nurse

## 2024-06-23 NOTE — ASSESSMENT & PLAN NOTE
-unknown origin  -SIRS criteria met with tachycardia and tachypnea  -systolic BP on arrival was 50's, patient did receive a 2 L bolus, which BP improved, but after bolus was done BP staarting to decrease and patient was placed on Levophed gtt  -Lactic: 7.6, will trend  -pracalcitonin: pending  -PRN O2 to keep saturation greater than 92%  -2 L IV fluids in the ED, IVF continued for now  -Broad Spectrum IV antibiotics started in the ED and will continue Zosyn and Vancomyin will narrow when appropriate  -keep MAP > than 65, currently on Levophed Gtt  -blood cultures pending  -UA pending  -CXR: No acute pulmonary process. Known multiple osseous metastases  -CT of ABD:  Fluid in the colon is compatible with diarrhea. No bowel obstruction. No abscess. Decreased small bilateral pleural effusions.  -CBC in am

## 2024-06-24 ENCOUNTER — APPOINTMENT (OUTPATIENT)
Age: 64
DRG: 720 | End: 2024-06-24
Payer: MEDICAID

## 2024-06-24 LAB
ALBUMIN SERPL-MCNC: 1.8 G/DL (ref 3.4–5)
ALBUMIN/GLOB SERPL: 0.4 (ref 0.8–1.7)
ALP SERPL-CCNC: 402 U/L (ref 45–117)
ALT SERPL-CCNC: 21 U/L (ref 16–61)
ANION GAP SERPL CALC-SCNC: 12 MMOL/L (ref 3–18)
ANION GAP SERPL CALC-SCNC: 12 MMOL/L (ref 3–18)
ANION GAP SERPL CALC-SCNC: 15 MMOL/L (ref 3–18)
AST SERPL W P-5'-P-CCNC: 173 U/L (ref 10–38)
BILIRUB SERPL-MCNC: 0.8 MG/DL (ref 0.2–1)
BUN SERPL-MCNC: 27 MG/DL (ref 7–18)
BUN SERPL-MCNC: 29 MG/DL (ref 7–18)
BUN SERPL-MCNC: 31 MG/DL (ref 7–18)
BUN/CREAT SERPL: 27 (ref 12–20)
BUN/CREAT SERPL: 28 (ref 12–20)
BUN/CREAT SERPL: 30 (ref 12–20)
CA-I BLD-MCNC: 8.9 MG/DL (ref 8.5–10.1)
CA-I BLD-MCNC: 8.9 MG/DL (ref 8.5–10.1)
CA-I BLD-MCNC: 9 MG/DL (ref 8.5–10.1)
CHLORIDE SERPL-SCNC: 87 MMOL/L (ref 100–111)
CHLORIDE SERPL-SCNC: 87 MMOL/L (ref 100–111)
CHLORIDE SERPL-SCNC: 91 MMOL/L (ref 100–111)
CO2 SERPL-SCNC: 21 MMOL/L (ref 21–32)
CO2 SERPL-SCNC: 21 MMOL/L (ref 21–32)
CO2 SERPL-SCNC: 22 MMOL/L (ref 21–32)
CREAT SERPL-MCNC: 0.97 MG/DL (ref 0.6–1.3)
CREAT SERPL-MCNC: 0.98 MG/DL (ref 0.6–1.3)
CREAT SERPL-MCNC: 1.13 MG/DL (ref 0.6–1.3)
ECHO AO ASC DIAM: 3.5 CM
ECHO AO ASCENDING AORTA INDEX: 1.54 CM/M2
ECHO AO ROOT DIAM: 3.3 CM
ECHO AO ROOT INDEX: 1.45 CM/M2
ECHO AV AREA PEAK VELOCITY: 2.9 CM2
ECHO AV AREA VTI: 3.3 CM2
ECHO AV AREA/BSA PEAK VELOCITY: 1.3 CM2/M2
ECHO AV AREA/BSA VTI: 1.5 CM2/M2
ECHO AV MEAN GRADIENT: 3 MMHG
ECHO AV MEAN VELOCITY: 0.8 M/S
ECHO AV PEAK GRADIENT: 8 MMHG
ECHO AV PEAK VELOCITY: 1.4 M/S
ECHO AV VELOCITY RATIO: 0.79
ECHO AV VTI: 16 CM
ECHO BSA: 2.34 M2
ECHO EST RA PRESSURE: 3 MMHG
ECHO IVC PROX: 1.5 CM
ECHO LA AREA 2C: 16.7 CM2
ECHO LA AREA 4C: 18.4 CM2
ECHO LA DIAMETER INDEX: 1.54 CM/M2
ECHO LA DIAMETER: 3.5 CM
ECHO LA MAJOR AXIS: 5.1 CM
ECHO LA MINOR AXIS: 4.6 CM
ECHO LA TO AORTIC ROOT RATIO: 1.06
ECHO LA VOL BP: 52 ML (ref 18–58)
ECHO LA VOL MOD A2C: 49 ML (ref 18–58)
ECHO LA VOL MOD A4C: 53 ML (ref 18–58)
ECHO LA VOL/BSA BIPLANE: 23 ML/M2 (ref 16–34)
ECHO LA VOLUME INDEX MOD A2C: 22 ML/M2 (ref 16–34)
ECHO LA VOLUME INDEX MOD A4C: 23 ML/M2 (ref 16–34)
ECHO LV E' LATERAL VELOCITY: 10 CM/S
ECHO LV E' SEPTAL VELOCITY: 9 CM/S
ECHO LV EDV A2C: 55 ML
ECHO LV EDV A4C: 84 ML
ECHO LV EDV INDEX A4C: 37 ML/M2
ECHO LV EDV NDEX A2C: 24 ML/M2
ECHO LV EJECTION FRACTION A2C: 54 %
ECHO LV EJECTION FRACTION A4C: 62 %
ECHO LV EJECTION FRACTION BIPLANE: 60 % (ref 55–100)
ECHO LV ESV A2C: 25 ML
ECHO LV ESV A4C: 32 ML
ECHO LV ESV INDEX A2C: 11 ML/M2
ECHO LV ESV INDEX A4C: 14 ML/M2
ECHO LV FRACTIONAL SHORTENING: 33 % (ref 28–44)
ECHO LV INTERNAL DIMENSION DIASTOLE INDEX: 2.25 CM/M2
ECHO LV INTERNAL DIMENSION DIASTOLIC: 5.1 CM (ref 4.2–5.9)
ECHO LV INTERNAL DIMENSION SYSTOLIC INDEX: 1.5 CM/M2
ECHO LV INTERNAL DIMENSION SYSTOLIC: 3.4 CM
ECHO LV IVSD: 1 CM (ref 0.6–1)
ECHO LV MASS 2D: 188 G (ref 88–224)
ECHO LV MASS INDEX 2D: 82.8 G/M2 (ref 49–115)
ECHO LV POSTERIOR WALL DIASTOLIC: 1 CM (ref 0.6–1)
ECHO LV RELATIVE WALL THICKNESS RATIO: 0.39
ECHO LVOT AREA: 3.5 CM2
ECHO LVOT AV VTI INDEX: 0.96
ECHO LVOT DIAM: 2.1 CM
ECHO LVOT MEAN GRADIENT: 2 MMHG
ECHO LVOT PEAK GRADIENT: 5 MMHG
ECHO LVOT PEAK VELOCITY: 1.1 M/S
ECHO LVOT STROKE VOLUME INDEX: 23.3 ML/M2
ECHO LVOT SV: 53 ML
ECHO LVOT VTI: 15.3 CM
ECHO MV A VELOCITY: 0.65 M/S
ECHO MV AREA VTI: 4.3 CM2
ECHO MV E DECELERATION TIME (DT): 101 MS
ECHO MV E VELOCITY: 0.58 M/S
ECHO MV E/A RATIO: 0.89
ECHO MV E/E' LATERAL: 5.8
ECHO MV E/E' RATIO (AVERAGED): 6.12
ECHO MV E/E' SEPTAL: 6.44
ECHO MV LVOT VTI INDEX: 0.81
ECHO MV MAX VELOCITY: 0.7 M/S
ECHO MV MEAN GRADIENT: 1 MMHG
ECHO MV MEAN VELOCITY: 0.5 M/S
ECHO MV PEAK GRADIENT: 2 MMHG
ECHO MV VTI: 12.4 CM
ECHO PV MAX VELOCITY: 0.4 M/S
ECHO PV PEAK GRADIENT: 1 MMHG
ECHO RA AREA 4C: 17.6 CM2
ECHO RA END SYSTOLIC VOLUME APICAL 4 CHAMBER INDEX BSA: 21 ML/M2
ECHO RA VOLUME: 47 ML
ECHO RIGHT VENTRICULAR SYSTOLIC PRESSURE (RVSP): 25 MMHG
ECHO RV BASAL DIMENSION: 3.9 CM
ECHO RV LONGITUDINAL DIMENSION: 7.8 CM
ECHO RV MID DIMENSION: 3.4 CM
ECHO RV TAPSE: 2 CM (ref 1.7–?)
ECHO TV REGURGITANT MAX VELOCITY: 2.35 M/S
ECHO TV REGURGITANT PEAK GRADIENT: 22 MMHG
EKG ATRIAL RATE: 115 BPM
EKG DIAGNOSIS: NORMAL
EKG P AXIS: 62 DEGREES
EKG P-R INTERVAL: 136 MS
EKG Q-T INTERVAL: 354 MS
EKG QRS DURATION: 98 MS
EKG QTC CALCULATION (BAZETT): 489 MS
EKG R AXIS: 23 DEGREES
EKG T AXIS: 112 DEGREES
EKG VENTRICULAR RATE: 115 BPM
ERYTHROCYTE [DISTWIDTH] IN BLOOD BY AUTOMATED COUNT: 16.2 % (ref 11.6–14.5)
GLOBULIN SER CALC-MCNC: 4.5 G/DL (ref 2–4)
GLUCOSE SERPL-MCNC: 107 MG/DL (ref 74–99)
GLUCOSE SERPL-MCNC: 131 MG/DL (ref 74–99)
GLUCOSE SERPL-MCNC: 139 MG/DL (ref 74–99)
HCT VFR BLD AUTO: 22.7 % (ref 36–48)
HGB BLD-MCNC: 7.2 G/DL (ref 13–16)
LACTATE SERPL-SCNC: 1 MMOL/L (ref 0.4–2)
MAGNESIUM SERPL-MCNC: 1.9 MG/DL (ref 1.6–2.6)
MCH RBC QN AUTO: 26.9 PG (ref 24–34)
MCHC RBC AUTO-ENTMCNC: 31.7 G/DL (ref 31–37)
MCV RBC AUTO: 84.7 FL (ref 78–100)
NRBC # BLD: 0.06 K/UL (ref 0–0.01)
NRBC BLD-RTO: 1.3 PER 100 WBC
PLATELET # BLD AUTO: 142 K/UL (ref 135–420)
PMV BLD AUTO: 9.9 FL (ref 9.2–11.8)
POTASSIUM SERPL-SCNC: 3.1 MMOL/L (ref 3.5–5.5)
POTASSIUM SERPL-SCNC: 3.1 MMOL/L (ref 3.5–5.5)
POTASSIUM SERPL-SCNC: 3.6 MMOL/L (ref 3.5–5.5)
PROT SERPL-MCNC: 6.3 G/DL (ref 6.4–8.2)
RBC # BLD AUTO: 2.68 M/UL (ref 4.35–5.65)
SODIUM SERPL-SCNC: 121 MMOL/L (ref 136–145)
SODIUM SERPL-SCNC: 123 MMOL/L (ref 136–145)
SODIUM SERPL-SCNC: 124 MMOL/L (ref 136–145)
T3FREE SERPL-MCNC: 0.8 PG/ML (ref 2.2–4)
T4 FREE SERPL-MCNC: 1.3 NG/DL (ref 0.7–1.5)
WBC # BLD AUTO: 4.6 K/UL (ref 4.6–13.2)

## 2024-06-24 PROCEDURE — P9047 ALBUMIN (HUMAN), 25%, 50ML: HCPCS | Performed by: INTERNAL MEDICINE

## 2024-06-24 PROCEDURE — 6360000002 HC RX W HCPCS: Performed by: INTERNAL MEDICINE

## 2024-06-24 PROCEDURE — 2000000000 HC ICU R&B

## 2024-06-24 PROCEDURE — 83605 ASSAY OF LACTIC ACID: CPT

## 2024-06-24 PROCEDURE — 83735 ASSAY OF MAGNESIUM: CPT

## 2024-06-24 PROCEDURE — 93306 TTE W/DOPPLER COMPLETE: CPT

## 2024-06-24 PROCEDURE — 6370000000 HC RX 637 (ALT 250 FOR IP): Performed by: INTERNAL MEDICINE

## 2024-06-24 PROCEDURE — 2580000003 HC RX 258: Performed by: NURSE PRACTITIONER

## 2024-06-24 PROCEDURE — 80048 BASIC METABOLIC PNL TOTAL CA: CPT

## 2024-06-24 PROCEDURE — 36415 COLL VENOUS BLD VENIPUNCTURE: CPT

## 2024-06-24 PROCEDURE — 2580000003 HC RX 258: Performed by: INTERNAL MEDICINE

## 2024-06-24 PROCEDURE — 84481 FREE ASSAY (FT-3): CPT

## 2024-06-24 PROCEDURE — 85027 COMPLETE CBC AUTOMATED: CPT

## 2024-06-24 PROCEDURE — 84439 ASSAY OF FREE THYROXINE: CPT

## 2024-06-24 PROCEDURE — 94761 N-INVAS EAR/PLS OXIMETRY MLT: CPT

## 2024-06-24 PROCEDURE — 6360000002 HC RX W HCPCS: Performed by: NURSE PRACTITIONER

## 2024-06-24 PROCEDURE — 80053 COMPREHEN METABOLIC PANEL: CPT

## 2024-06-24 PROCEDURE — 6370000000 HC RX 637 (ALT 250 FOR IP): Performed by: NURSE PRACTITIONER

## 2024-06-24 RX ORDER — POTASSIUM CHLORIDE 750 MG/1
40 TABLET, EXTENDED RELEASE ORAL ONCE
Status: COMPLETED | OUTPATIENT
Start: 2024-06-24 | End: 2024-06-24

## 2024-06-24 RX ORDER — POTASSIUM CHLORIDE 750 MG/1
40 TABLET, EXTENDED RELEASE ORAL EVERY 4 HOURS
Status: COMPLETED | OUTPATIENT
Start: 2024-06-24 | End: 2024-06-24

## 2024-06-24 RX ORDER — POTASSIUM CHLORIDE 7.45 MG/ML
10 INJECTION INTRAVENOUS
Status: COMPLETED | OUTPATIENT
Start: 2024-06-24 | End: 2024-06-24

## 2024-06-24 RX ORDER — ENOXAPARIN SODIUM 100 MG/ML
30 INJECTION SUBCUTANEOUS 2 TIMES DAILY
Status: DISCONTINUED | OUTPATIENT
Start: 2024-06-24 | End: 2024-06-26 | Stop reason: HOSPADM

## 2024-06-24 RX ORDER — PROCHLORPERAZINE EDISYLATE 5 MG/ML
10 INJECTION INTRAMUSCULAR; INTRAVENOUS EVERY 6 HOURS PRN
Status: DISCONTINUED | OUTPATIENT
Start: 2024-06-24 | End: 2024-06-26 | Stop reason: HOSPADM

## 2024-06-24 RX ORDER — ALBUMIN (HUMAN) 12.5 G/50ML
25 SOLUTION INTRAVENOUS ONCE
Status: COMPLETED | OUTPATIENT
Start: 2024-06-24 | End: 2024-06-24

## 2024-06-24 RX ADMIN — POTASSIUM CHLORIDE 40 MEQ: 750 TABLET, EXTENDED RELEASE ORAL at 09:37

## 2024-06-24 RX ADMIN — ENOXAPARIN SODIUM 30 MG: 100 INJECTION SUBCUTANEOUS at 20:45

## 2024-06-24 RX ADMIN — TRAMADOL HYDROCHLORIDE 50 MG: 50 TABLET ORAL at 12:01

## 2024-06-24 RX ADMIN — SODIUM CHLORIDE: 9 INJECTION, SOLUTION INTRAVENOUS at 05:16

## 2024-06-24 RX ADMIN — PIPERACILLIN AND TAZOBACTAM 4500 MG: 4; .5 INJECTION, POWDER, LYOPHILIZED, FOR SOLUTION INTRAVENOUS; PARENTERAL at 06:32

## 2024-06-24 RX ADMIN — HYDROCORTISONE SODIUM SUCCINATE 100 MG: 100 INJECTION, POWDER, FOR SOLUTION INTRAMUSCULAR; INTRAVENOUS at 16:08

## 2024-06-24 RX ADMIN — POTASSIUM CHLORIDE 10 MEQ: 7.46 INJECTION, SOLUTION INTRAVENOUS at 16:07

## 2024-06-24 RX ADMIN — POTASSIUM CHLORIDE 40 MEQ: 750 TABLET, EXTENDED RELEASE ORAL at 23:55

## 2024-06-24 RX ADMIN — TRAMADOL HYDROCHLORIDE 50 MG: 50 TABLET ORAL at 06:24

## 2024-06-24 RX ADMIN — PROCHLORPERAZINE EDISYLATE 10 MG: 5 INJECTION INTRAMUSCULAR; INTRAVENOUS at 19:00

## 2024-06-24 RX ADMIN — SODIUM CHLORIDE, PRESERVATIVE FREE 10 ML: 5 INJECTION INTRAVENOUS at 20:47

## 2024-06-24 RX ADMIN — SODIUM CHLORIDE 1250 MG: 9 INJECTION, SOLUTION INTRAVENOUS at 09:47

## 2024-06-24 RX ADMIN — POTASSIUM CHLORIDE 10 MEQ: 7.46 INJECTION, SOLUTION INTRAVENOUS at 12:50

## 2024-06-24 RX ADMIN — POTASSIUM CHLORIDE 10 MEQ: 7.46 INJECTION, SOLUTION INTRAVENOUS at 14:45

## 2024-06-24 RX ADMIN — SODIUM CHLORIDE, PRESERVATIVE FREE 10 ML: 5 INJECTION INTRAVENOUS at 07:53

## 2024-06-24 RX ADMIN — POTASSIUM CHLORIDE 10 MEQ: 7.46 INJECTION, SOLUTION INTRAVENOUS at 13:42

## 2024-06-24 RX ADMIN — ENOXAPARIN SODIUM 30 MG: 100 INJECTION SUBCUTANEOUS at 09:37

## 2024-06-24 RX ADMIN — SODIUM CHLORIDE: 9 INJECTION, SOLUTION INTRAVENOUS at 12:34

## 2024-06-24 RX ADMIN — ACETAMINOPHEN 650 MG: 325 TABLET ORAL at 05:02

## 2024-06-24 RX ADMIN — PIPERACILLIN AND TAZOBACTAM 4500 MG: 4; .5 INJECTION, POWDER, LYOPHILIZED, FOR SOLUTION INTRAVENOUS; PARENTERAL at 12:33

## 2024-06-24 RX ADMIN — HYDROCORTISONE SODIUM SUCCINATE 100 MG: 100 INJECTION, POWDER, FOR SOLUTION INTRAMUSCULAR; INTRAVENOUS at 09:37

## 2024-06-24 RX ADMIN — ONDANSETRON 4 MG: 2 INJECTION INTRAMUSCULAR; INTRAVENOUS at 23:55

## 2024-06-24 RX ADMIN — POTASSIUM CHLORIDE 40 MEQ: 750 TABLET, EXTENDED RELEASE ORAL at 12:02

## 2024-06-24 RX ADMIN — ONDANSETRON 4 MG: 2 INJECTION INTRAMUSCULAR; INTRAVENOUS at 17:56

## 2024-06-24 RX ADMIN — SODIUM CHLORIDE: 9 INJECTION, SOLUTION INTRAVENOUS at 20:37

## 2024-06-24 RX ADMIN — PIPERACILLIN AND TAZOBACTAM 4500 MG: 4; .5 INJECTION, POWDER, LYOPHILIZED, FOR SOLUTION INTRAVENOUS; PARENTERAL at 01:32

## 2024-06-24 RX ADMIN — ALBUMIN (HUMAN) 25 G: 0.25 INJECTION, SOLUTION INTRAVENOUS at 09:30

## 2024-06-24 RX ADMIN — ONDANSETRON 4 MG: 2 INJECTION INTRAMUSCULAR; INTRAVENOUS at 11:30

## 2024-06-24 RX ADMIN — SODIUM CHLORIDE 1250 MG: 9 INJECTION, SOLUTION INTRAVENOUS at 21:30

## 2024-06-24 RX ADMIN — ONDANSETRON 4 MG: 2 INJECTION INTRAMUSCULAR; INTRAVENOUS at 05:47

## 2024-06-24 RX ADMIN — PIPERACILLIN AND TAZOBACTAM 4500 MG: 4; .5 INJECTION, POWDER, LYOPHILIZED, FOR SOLUTION INTRAVENOUS; PARENTERAL at 20:46

## 2024-06-24 RX ADMIN — HYDROCORTISONE SODIUM SUCCINATE 100 MG: 100 INJECTION, POWDER, FOR SOLUTION INTRAMUSCULAR; INTRAVENOUS at 23:55

## 2024-06-24 ASSESSMENT — PAIN DESCRIPTION - ORIENTATION
ORIENTATION: RIGHT
ORIENTATION: RIGHT

## 2024-06-24 ASSESSMENT — PAIN SCALES - GENERAL
PAINLEVEL_OUTOF10: 0
PAINLEVEL_OUTOF10: 2
PAINLEVEL_OUTOF10: 0
PAINLEVEL_OUTOF10: 0
PAINLEVEL_OUTOF10: 5
PAINLEVEL_OUTOF10: 0
PAINLEVEL_OUTOF10: 0
PAINLEVEL_OUTOF10: 7

## 2024-06-24 ASSESSMENT — PAIN DESCRIPTION - FREQUENCY: FREQUENCY: INTERMITTENT

## 2024-06-24 ASSESSMENT — PAIN DESCRIPTION - LOCATION
LOCATION: LEG;GENERALIZED
LOCATION: LEG

## 2024-06-24 ASSESSMENT — PAIN - FUNCTIONAL ASSESSMENT
PAIN_FUNCTIONAL_ASSESSMENT: PREVENTS OR INTERFERES SOME ACTIVE ACTIVITIES AND ADLS
PAIN_FUNCTIONAL_ASSESSMENT: ACTIVITIES ARE NOT PREVENTED

## 2024-06-24 ASSESSMENT — PAIN DESCRIPTION - DESCRIPTORS
DESCRIPTORS: ACHING
DESCRIPTORS: DISCOMFORT

## 2024-06-24 ASSESSMENT — PAIN DESCRIPTION - PAIN TYPE: TYPE: CHRONIC PAIN

## 2024-06-24 ASSESSMENT — PAIN SCALES - WONG BAKER: WONGBAKER_NUMERICALRESPONSE: NO HURT

## 2024-06-24 NOTE — PROGRESS NOTES
George Wong    0647-Bedside and Verbal shift change report received by. Report included the following information Nurse Handoff Report, ED SBAR, Adult Overview, Surgery Report, Intake/Output, Recent Results, Med Rec Status, and Cardiac Rhythm sinus tach .     0700-Patient assessment completed: patient is a/o x 3 , HX of intellectual disability, patient is verbal, clear speech, follow commands, - for temperature, Lung sounds clear on RA, + nausea, ABD soft and non tender with active Bowel sounds, FMS in place, Skin intact, Items and call light in reach, Patient questions answered appropriately. Patient repositioned     Right Fem TLC- Infusing Levo at 4 mcg/min    Patient Concerns at this time: None  Plan of care reviewed & Ongoing  Patient Education Provided  White board updated and completed.    0730-patient assisted with breakfast tray; patient fed, consumed 50% of meals    0830-ECHO tech at bedside    0840-MD rounding; new orders reviewed    Patient family at bedside; update provided, Case Management at bedside     0930-Albumin administered, Levo titration downwards  0947-Vancomycin administered  1000-Oral temp check; Patient remains without temp; currently 98.0    1121-Levophed Titrated off at this time; current  /89 with MAP 89. Will continue to monitor      1240-MD on unit; review updated BMP lab, order for IV Potassium 10meq for a total of 40meq    1330-Patient remains off levo, patient remains fever free, patient resting quietly without distress.     1450-Family at bedside; patient resting, VSS    1600-patient watching tv, VSS, patient remain off levophed, CBWR    1756-PRN iv zofran given for patient c/o nausea    1830-NP made aware of patient vomiting despite zofran given, Order received for compazine       PRN medication given: zofran  Events during shift: Nausea & vomiting x 1 ( resolved)  New orders received during shift: albumin, PO potassium, hydrocortisone, Lovenox order changed    Patient  end of shift concerns: None      .

## 2024-06-24 NOTE — FLOWSHEET NOTE
06/24/24 0700   Vitals   Temp 98.3 °F (36.8 °C)   Temp Source Oral   Pulse (!) 119   Heart Rate Source Monitor   Respirations (!) 35   BP (!) 102/53   MAP (Calculated) 69   MAP (mmHg) 67   BP Location Left upper arm   BP Upper/Lower Upper   BP Method Automatic   Patient Position Semi fowlers   Pain Assessment   Pain Assessment None - Denies Pain   Pain Level 0   Patient's Stated Pain Goal 0 - No pain   Oxygen Therapy   SpO2 97 %   Pulse via Oximetry 120 beats per minute   O2 Device None (Room air)

## 2024-06-24 NOTE — CARE COORDINATION
06/24/24 1239   Service Assessment   Patient Orientation Alert and Oriented   Cognition Alert   History Provided By Patient;Child/Family   Primary Caregiver Family   Accompanied By/Relationship Mother Caty and nephew   Support Systems Family Members;Parent   Patient's Healthcare Decision Maker is: Legal Next of Kin   PCP Verified by CM Yes   Last Visit to PCP Within last 3 months   Prior Functional Level Independent in ADLs/IADLs   Current Functional Level Assistance with the following:;Bathing;Dressing;Toileting;Feeding;Cooking;Housework;Shopping;Mobility   Can patient return to prior living arrangement Yes   Ability to make needs known: Good   Family able to assist with home care needs: Yes   Would you like for me to discuss the discharge plan with any other family members/significant others, and if so, who? Yes  (Mother Caty)   Financial Resources Medicaid   Community Resources None   CM/SW Referral Disease Management Education     Patient lives at home with sister who assists pt as well as his mother that is in the home frequently to assist pt with needs, mostly independent until recent illness/weakness, would shop for groceries for himself.  Wants to return home at DC.  Needs liftchair and hospital bed which is ordered.  Per mother UAI was completed a week ago, will check status for help at home.  CM following for DC needs.      1253  Do not see a UAI completed, call to [place]  made with no answer, VM left.

## 2024-06-24 NOTE — PROGRESS NOTES
1900: Bedside shift change report given to Reanna Monterroso RN/Alexandria Diggs RN (oncoming nurse) by Melissa Faustin RN (offgoing nurse). Report included the following information Nurse Handoff Report, Adult Overview, Intake/Output, Recent Results, Cardiac Rhythm Sinus Tach, Neuro Assessment, and Event Log.    Patient resting in bed. Complaint of R sided leg pain. Pillows placed under L side of patient and pillow placed under R leg. Assessment performed. Lines traced and verified. Bed in lowest position and call bell within reach.     2000: Patient requested pain medication. Reviewed labs and spoke to NP Caroline. Tramadol ordered PRN. Medication administered, tablet swallowed without difficulty.     2039: Levophed reduced to 10 mcg.     2200: Lab work obtained via CVC. New bag of NS hung at 125 ml/h. Patient maintaining MAP greater than 65 with Levophed at 10 mcg.     2303: Bladder scan performed. 246 noted. Second scan performed, 246 noted. Patient denies tenderness with palpation.     0054: Levophed reduced to 9 mcg.    0136: Levophed reduced to 8 mcg. Patient voided spontaneously without issue with primofit.    0333: Labs obtained via CVC. Patient tolerated well.     0348: Levophed reduced to 7 mcg. HR maintaining at 122. Lizbeth, NP aware.     0408: Levophed reduced to 6 mcg. Patient tolerating well.      0502: Temperature noted to be 100.7. Administered Tylenol. Denies pain at this time.     0616: Patient had episode of vomiting. Gown and bedding changed. Patient repositioned, brief and under pad changed. Sheets changed. Franko provided to patient, instructed on use. Temperature retaken. 101.8. Interventions taken: Removal of sheets, room temp and ice packs provided.     0647: Bedside shift change report given to NIGEL Corral (oncoming nurse) by NIGEL Forte/NIGEL Ibrahim (offgoing nurse).

## 2024-06-24 NOTE — PROGRESS NOTES
Hospitalist Progress Note             Date of Service:  2024  NAME:  George Wong  :  1960  MRN:  [id number]      HPI patient remains in ICU on pressors but has absolutely no symptoms denies all pain shortness of breath chest pain fever chills feels better than yesterday    Assessment & Plan:      Severe sepsis (HCC)  -Still unclear etiology at this point in time no source has been found likely renal function and lactic acid has improved nicely after IV fluids continue broad-spectrum antibiotics for the time being while we watch cultures  -Remains on Levophed drip this morning through a central line to his groin would like to have this line out as soon as possible as he does have 2 nice IVs in his arms will give a dose of albumin due to his poor nutritional state and hypotension requiring pressors we will also start 3 doses of stress dose steroids  -SIRS criteria met with tachycardia and tachypnea  -systolic BP on arrival was 50's, patient did receive a 2 L bolus, which BP improved, but after bolus was done BP staarting to decrease and patient was placed on Levophed gtt  -Lactic: Present on admission 7.6, will trend-has improved to normal  -pracalcitonin: Quite elevated at 60  -PRN O2 to keep saturation greater than 92%  -2 L IV fluids in the ED, IVF continued for now  -Broad Spectrum IV antibiotics started in the ED and will continue Zosyn and Vancomyin will narrow when appropriate  -keep MAP > than 65, currently on Levophed Gtt  -blood cultures pending  -UA cloudy with 3+ bacteria no white blood cells no nitrites and no leukocyte Estrace  -CXR: No acute pulmonary process. Known multiple osseous metastases  -CT of ABD:  Fluid in the colon is compatible with diarrhea. No bowel obstruction. No abscess. Decreased small bilateral pleural effusions.-Patient has a rectal tube in but has no output at this time C.

## 2024-06-24 NOTE — PROGRESS NOTES
conducted a Follow up consultation and Spiritual Assessment for George Wong, who is a 64 y.o.,male.      The  provided the following Interventions:  Continued the relationship of care and support.   Listened empathically.  Chart reviewed.    The following outcomes were achieved:  Patient expressed gratitude for 's visit.    Assessment:  There are no further spiritual or Yazidism issues which require Spiritual Care Services interventions at this time.     Plan:  Chaplains will continue to follow and will provide pastoral care on an as needed/requested basis.   recommends bedside caregivers page  on duty if patient shows signs of acute spiritual or emotional distress.        Rita Parker   Spiritual Care   [phone]

## 2024-06-24 NOTE — PLAN OF CARE
Problem: Skin/Tissue Integrity  Goal: Absence of new skin breakdown  Description: 1.  Monitor for areas of redness and/or skin breakdown  2.  Assess vascular access sites hourly  3.  Every 4-6 hours minimum:  Change oxygen saturation probe site  4.  Every 4-6 hours:  If on nasal continuous positive airway pressure, respiratory therapy assess nares and determine need for appliance change or resting period.  6/24/2024 0908 by Rogers Anderson, RN  Outcome: Progressing  6/23/2024 2022 by Alexandria Diggs, RN  Outcome: Progressing     Problem: ABCDS Injury Assessment  Goal: Absence of physical injury  6/24/2024 0908 by Rogers Anderson, RN  Outcome: Progressing  6/23/2024 2022 by Alexandria Diggs, RN  Outcome: Progressing     Problem: Chronic Conditions and Co-morbidities  Goal: Patient's chronic conditions and co-morbidity symptoms are monitored and maintained or improved  6/23/2024 2022 by Alexandria Diggs, RN  Outcome: Progressing

## 2024-06-24 NOTE — PROGRESS NOTES
Physical Therapy  Attempt to evaluate pt this morning and they are having a test. Will return to attempt evaluation as able.  Edgardo Celestin, PT, DPT

## 2024-06-24 NOTE — PROGRESS NOTES
Patient unable to assess.  He was eating lunch at the time.  I will return when he is able to talk.       Rita Parker  Spiritual Care   [phone]

## 2024-06-24 NOTE — PLAN OF CARE
Problem: Discharge Planning  Goal: Discharge to home or other facility with appropriate resources  6/23/2024 2022 by Alexandria Diggs RN  Outcome: Progressing  6/23/2024 1626 by Melissa Faustin RN  Outcome: Progressing  Flowsheets (Taken 6/23/2024 1615)  Discharge to home or other facility with appropriate resources:   Identify barriers to discharge with patient and caregiver   Arrange for needed discharge resources and transportation as appropriate   Identify discharge learning needs (meds, wound care, etc)     Problem: Safety - Adult  Goal: Free from fall injury  6/23/2024 2022 by Alexandria Diggs, RN  Outcome: Progressing  6/23/2024 1626 by Melissa Faustin RN  Outcome: Progressing     Problem: Skin/Tissue Integrity  Goal: Absence of new skin breakdown  Description: 1.  Monitor for areas of redness and/or skin breakdown  2.  Assess vascular access sites hourly  3.  Every 4-6 hours minimum:  Change oxygen saturation probe site  4.  Every 4-6 hours:  If on nasal continuous positive airway pressure, respiratory therapy assess nares and determine need for appliance change or resting period.  6/23/2024 2022 by Alexandria Diggs, RN  Outcome: Progressing  6/23/2024 1626 by Melissa Faustin RN  Outcome: Progressing     Problem: ABCDS Injury Assessment  Goal: Absence of physical injury  6/23/2024 2022 by Alexandria Diggs RN  Outcome: Progressing  6/23/2024 1626 by Melissa Faustin RN  Outcome: Progressing     Problem: Chronic Conditions and Co-morbidities  Goal: Patient's chronic conditions and co-morbidity symptoms are monitored and maintained or improved  6/23/2024 2022 by Alexandria Diggs, RN  Outcome: Progressing  6/23/2024 1626 by Melissa Faustin RN  Outcome: Progressing  Flowsheets (Taken 6/23/2024 1615)  Care Plan - Patient's Chronic Conditions and Co-Morbidity Symptoms are Monitored and Maintained or Improved:   Monitor and assess patient's chronic conditions and comorbid symptoms for stability, deterioration, or  ordered   Monitor response to electrolyte replacements, including repeat lab results as appropriate     Problem: Hematologic - Adult  Goal: Maintains hematologic stability  Outcome: Progressing  Flowsheets (Taken 6/23/2024 1615 by Melissa Faustin, RN)  Maintains hematologic stability:   Assess for signs and symptoms of bleeding or hemorrhage   Monitor labs for bleeding or clotting disorders   Administer blood products/factors as ordered     Problem: Spiritual Care  Goal: Pt/Family able to move forward in process of forgiving self, others, and/or higher power  Description: INTERVENTIONS:  1. Assist patient/family to overcome blocks to healing by use of spiritual practices (prayer, meditation, guided imagery, reiki, breath work, etc).  2. De-myth guilt and help patient/family identify possible irrational spiritual/cultural beliefs and values.  3. Explore possibilities of making amends & reconciliation with self, others, and/or a greater power.  4. Guide patient/family in identifying painful feelings of guilt.  5. Help patient/famiy explore and identify spiritual beliefs, cultural understandings or values that may help or hinder letting go of issue.  6. Help patient/family explore feelings of anger, bitterness, resentment.  7. Help patient/family identify and examine the situation in which these feelings are experienced.  8. Help patient/family identify destructive displacement of feelings onto other individuals.  9. Invite use of sacraments/rituals/ceremonies as appropriate (e.g. - confession, anointing, smudging).  10. Refer patient/family to formal counseling and/or to [place] community for further support work.  Outcome: Progressing  Flowsheets (Taken 6/23/2024 1615 by Melissa Faustin, RN)  Patient/family able to move forward in process of forgiving self, others, and/or higher power:   Assist patient to overcome blocks to healing by use of spiritual practices (prayer, meditation, guided imagery, reiki, breath work,  etc)   De-myth guilt and help patient/family identify possible irrational spiritual/cultural beliefs and values   Help patient explore and identify spiritual beliefs, cultural understandings or values that may help or hinder letting go of issue

## 2024-06-25 ENCOUNTER — APPOINTMENT (OUTPATIENT)
Age: 64
DRG: 720 | End: 2024-06-25
Payer: MEDICAID

## 2024-06-25 VITALS
RESPIRATION RATE: 36 BRPM | HEART RATE: 108 BPM | DIASTOLIC BLOOD PRESSURE: 65 MMHG | HEIGHT: 70 IN | TEMPERATURE: 98.1 F | BODY MASS INDEX: 35.36 KG/M2 | WEIGHT: 247 LBS | OXYGEN SATURATION: 97 % | SYSTOLIC BLOOD PRESSURE: 98 MMHG

## 2024-06-25 LAB
ANION GAP SERPL CALC-SCNC: 11 MMOL/L (ref 3–18)
ANION GAP SERPL CALC-SCNC: 12 MMOL/L (ref 3–18)
ANION GAP SERPL CALC-SCNC: 12 MMOL/L (ref 3–18)
ANION GAP SERPL CALC-SCNC: 13 MMOL/L (ref 3–18)
ANION GAP SERPL CALC-SCNC: 21 MMOL/L (ref 3–18)
BASOPHILS # BLD: 0.1 K/UL (ref 0–0.1)
BASOPHILS NFR BLD: 1 % (ref 0–2)
BNP SERPL-MCNC: 2051 PG/ML (ref 0–900)
BUN SERPL-MCNC: 29 MG/DL (ref 7–18)
BUN SERPL-MCNC: 29 MG/DL (ref 7–18)
BUN SERPL-MCNC: 30 MG/DL (ref 7–18)
BUN SERPL-MCNC: 31 MG/DL (ref 7–18)
BUN SERPL-MCNC: 33 MG/DL (ref 7–18)
BUN/CREAT SERPL: 22 (ref 12–20)
BUN/CREAT SERPL: 30 (ref 12–20)
BUN/CREAT SERPL: 32 (ref 12–20)
BUN/CREAT SERPL: 33 (ref 12–20)
BUN/CREAT SERPL: 33 (ref 12–20)
CA-I BLD-MCNC: 8.9 MG/DL (ref 8.5–10.1)
CA-I BLD-MCNC: 8.9 MG/DL (ref 8.5–10.1)
CA-I BLD-MCNC: 9 MG/DL (ref 8.5–10.1)
CA-I BLD-MCNC: 9.1 MG/DL (ref 8.5–10.1)
CA-I BLD-MCNC: 9.2 MG/DL (ref 8.5–10.1)
CHLORIDE SERPL-SCNC: 92 MMOL/L (ref 100–111)
CHLORIDE SERPL-SCNC: 92 MMOL/L (ref 100–111)
CHLORIDE SERPL-SCNC: 94 MMOL/L (ref 100–111)
CHLORIDE SERPL-SCNC: 98 MMOL/L (ref 100–111)
CHLORIDE SERPL-SCNC: 98 MMOL/L (ref 100–111)
CO2 SERPL-SCNC: 11 MMOL/L (ref 21–32)
CO2 SERPL-SCNC: 16 MMOL/L (ref 21–32)
CO2 SERPL-SCNC: 22 MMOL/L (ref 21–32)
CREAT SERPL-MCNC: 0.9 MG/DL (ref 0.6–1.3)
CREAT SERPL-MCNC: 0.91 MG/DL (ref 0.6–1.3)
CREAT SERPL-MCNC: 0.95 MG/DL (ref 0.6–1.3)
CREAT SERPL-MCNC: 0.96 MG/DL (ref 0.6–1.3)
CREAT SERPL-MCNC: 1.53 MG/DL (ref 0.6–1.3)
DIFFERENTIAL METHOD BLD: ABNORMAL
EOSINOPHIL # BLD: 0 K/UL (ref 0–0.4)
EOSINOPHIL NFR BLD: 1 % (ref 0–5)
ERYTHROCYTE [DISTWIDTH] IN BLOOD BY AUTOMATED COUNT: 16.1 % (ref 11.6–14.5)
ERYTHROCYTE [DISTWIDTH] IN BLOOD BY AUTOMATED COUNT: 16.6 % (ref 11.6–14.5)
GLUCOSE BLD STRIP.AUTO-MCNC: 92 MG/DL (ref 70–110)
GLUCOSE SERPL-MCNC: 117 MG/DL (ref 74–99)
GLUCOSE SERPL-MCNC: 140 MG/DL (ref 74–99)
GLUCOSE SERPL-MCNC: 150 MG/DL (ref 74–99)
GLUCOSE SERPL-MCNC: 159 MG/DL (ref 74–99)
GLUCOSE SERPL-MCNC: 56 MG/DL (ref 74–99)
HCT VFR BLD AUTO: 15.4 % (ref 36–48)
HCT VFR BLD AUTO: 20 % (ref 36–48)
HCT VFR BLD AUTO: 20.9 % (ref 36–48)
HGB BLD-MCNC: 4.8 G/DL (ref 13–16)
HGB BLD-MCNC: 6.2 G/DL (ref 13–16)
HGB BLD-MCNC: 6.7 G/DL (ref 13–16)
IMM GRANULOCYTES # BLD AUTO: 0 K/UL (ref 0–0.04)
IMM GRANULOCYTES NFR BLD AUTO: 7 % (ref 0–0.5)
LYMPHOCYTES # BLD: 1.3 K/UL (ref 0.9–3.6)
LYMPHOCYTES NFR BLD: 24 % (ref 21–52)
MAGNESIUM SERPL-MCNC: 2.3 MG/DL (ref 1.6–2.6)
MCH RBC QN AUTO: 27.5 PG (ref 24–34)
MCH RBC QN AUTO: 27.6 PG (ref 24–34)
MCH RBC QN AUTO: 28.2 PG (ref 24–34)
MCHC RBC AUTO-ENTMCNC: 31 G/DL (ref 31–37)
MCHC RBC AUTO-ENTMCNC: 31.2 G/DL (ref 31–37)
MCHC RBC AUTO-ENTMCNC: 32.1 G/DL (ref 31–37)
MCV RBC AUTO: 85.7 FL (ref 78–100)
MCV RBC AUTO: 90.6 FL (ref 78–100)
MONOCYTES # BLD: 0.7 K/UL (ref 0.05–1.2)
MONOCYTES NFR BLD: 13 % (ref 3–10)
NEUTS SEG # BLD: 2.8 K/UL (ref 1.8–8)
NEUTS SEG NFR BLD: 54 % (ref 40–73)
NRBC # BLD: 0.02 K/UL (ref 0–0.01)
NRBC # BLD: 0.29 K/UL (ref 0–0.01)
NRBC BLD-RTO: 0.4 PER 100 WBC
NRBC BLD-RTO: 4.1 PER 100 WBC
PERFORMED BY:: NORMAL
PHOSPHATE SERPL-MCNC: 3.4 MG/DL (ref 2.5–4.9)
PLATELET # BLD AUTO: 104 K/UL (ref 135–420)
PLATELET # BLD AUTO: 116 K/UL (ref 135–420)
PMV BLD AUTO: 8.8 FL (ref 9.2–11.8)
PMV BLD AUTO: 9.3 FL (ref 9.2–11.8)
POTASSIUM SERPL-SCNC: 3.1 MMOL/L (ref 3.5–5.5)
POTASSIUM SERPL-SCNC: 3.2 MMOL/L (ref 3.5–5.5)
POTASSIUM SERPL-SCNC: 3.2 MMOL/L (ref 3.5–5.5)
POTASSIUM SERPL-SCNC: 3.7 MMOL/L (ref 3.5–5.5)
POTASSIUM SERPL-SCNC: 4.8 MMOL/L (ref 3.5–5.5)
PROCALCITONIN SERPL-MCNC: 57.02 NG/ML
RBC # BLD AUTO: 1.7 M/UL (ref 4.35–5.65)
RBC # BLD AUTO: 2.44 M/UL (ref 4.35–5.65)
SODIUM SERPL-SCNC: 126 MMOL/L (ref 136–145)
SODIUM SERPL-SCNC: 126 MMOL/L (ref 136–145)
SODIUM SERPL-SCNC: 127 MMOL/L (ref 136–145)
SODIUM SERPL-SCNC: 127 MMOL/L (ref 136–145)
SODIUM SERPL-SCNC: 130 MMOL/L (ref 136–145)
TROPONIN I SERPL HS-MCNC: <3 NG/L (ref 0–78)
VANCOMYCIN TROUGH SERPL-MCNC: 16.7 UG/ML (ref 10–20)
WBC # BLD AUTO: 5.3 K/UL (ref 4.6–13.2)
WBC # BLD AUTO: 7.1 K/UL (ref 4.6–13.2)

## 2024-06-25 PROCEDURE — 85018 HEMOGLOBIN: CPT

## 2024-06-25 PROCEDURE — 92950 HEART/LUNG RESUSCITATION CPR: CPT

## 2024-06-25 PROCEDURE — 84484 ASSAY OF TROPONIN QUANT: CPT

## 2024-06-25 PROCEDURE — 2580000003 HC RX 258: Performed by: INTERNAL MEDICINE

## 2024-06-25 PROCEDURE — 84145 PROCALCITONIN (PCT): CPT

## 2024-06-25 PROCEDURE — 6360000002 HC RX W HCPCS: Performed by: NURSE PRACTITIONER

## 2024-06-25 PROCEDURE — 80202 ASSAY OF VANCOMYCIN: CPT

## 2024-06-25 PROCEDURE — 2700000000 HC OXYGEN THERAPY PER DAY

## 2024-06-25 PROCEDURE — 6360000002 HC RX W HCPCS: Performed by: INTERNAL MEDICINE

## 2024-06-25 PROCEDURE — 85025 COMPLETE CBC W/AUTO DIFF WBC: CPT

## 2024-06-25 PROCEDURE — 31720 CLEARANCE OF AIRWAYS: CPT

## 2024-06-25 PROCEDURE — 2500000003 HC RX 250 WO HCPCS

## 2024-06-25 PROCEDURE — 84100 ASSAY OF PHOSPHORUS: CPT

## 2024-06-25 PROCEDURE — 2500000003 HC RX 250 WO HCPCS: Performed by: NURSE PRACTITIONER

## 2024-06-25 PROCEDURE — 94002 VENT MGMT INPAT INIT DAY: CPT

## 2024-06-25 PROCEDURE — 80048 BASIC METABOLIC PNL TOTAL CA: CPT

## 2024-06-25 PROCEDURE — 85027 COMPLETE CBC AUTOMATED: CPT

## 2024-06-25 PROCEDURE — 82962 GLUCOSE BLOOD TEST: CPT

## 2024-06-25 PROCEDURE — 6370000000 HC RX 637 (ALT 250 FOR IP): Performed by: INTERNAL MEDICINE

## 2024-06-25 PROCEDURE — 97162 PT EVAL MOD COMPLEX 30 MIN: CPT

## 2024-06-25 PROCEDURE — 97530 THERAPEUTIC ACTIVITIES: CPT

## 2024-06-25 PROCEDURE — 36415 COLL VENOUS BLD VENIPUNCTURE: CPT

## 2024-06-25 PROCEDURE — 5A1935Z RESPIRATORY VENTILATION, LESS THAN 24 CONSECUTIVE HOURS: ICD-10-PCS | Performed by: EMERGENCY MEDICINE

## 2024-06-25 PROCEDURE — 31500 INSERT EMERGENCY AIRWAY: CPT

## 2024-06-25 PROCEDURE — 30233N1 TRANSFUSION OF NONAUTOLOGOUS RED BLOOD CELLS INTO PERIPHERAL VEIN, PERCUTANEOUS APPROACH: ICD-10-PCS | Performed by: NURSE PRACTITIONER

## 2024-06-25 PROCEDURE — 2580000003 HC RX 258: Performed by: NURSE PRACTITIONER

## 2024-06-25 PROCEDURE — 85014 HEMATOCRIT: CPT

## 2024-06-25 PROCEDURE — 2000000000 HC ICU R&B

## 2024-06-25 PROCEDURE — 94761 N-INVAS EAR/PLS OXIMETRY MLT: CPT

## 2024-06-25 PROCEDURE — 83880 ASSAY OF NATRIURETIC PEPTIDE: CPT

## 2024-06-25 PROCEDURE — 83735 ASSAY OF MAGNESIUM: CPT

## 2024-06-25 RX ORDER — ETOMIDATE 2 MG/ML
INJECTION INTRAVENOUS
Status: DISCONTINUED
Start: 2024-06-25 | End: 2024-06-25

## 2024-06-25 RX ORDER — ETOMIDATE 2 MG/ML
20 INJECTION INTRAVENOUS ONCE
Status: COMPLETED | OUTPATIENT
Start: 2024-06-25 | End: 2024-06-25

## 2024-06-25 RX ORDER — ETOMIDATE 2 MG/ML
100 INJECTION INTRAVENOUS ONCE
Status: DISCONTINUED | OUTPATIENT
Start: 2024-06-25 | End: 2024-06-25

## 2024-06-25 RX ORDER — PROPOFOL 10 MG/ML
5-50 INJECTION, EMULSION INTRAVENOUS CONTINUOUS
Status: DISCONTINUED | OUTPATIENT
Start: 2024-06-25 | End: 2024-06-26 | Stop reason: HOSPADM

## 2024-06-25 RX ORDER — POTASSIUM CHLORIDE 750 MG/1
40 TABLET, EXTENDED RELEASE ORAL EVERY 4 HOURS
Status: COMPLETED | OUTPATIENT
Start: 2024-06-25 | End: 2024-06-25

## 2024-06-25 RX ORDER — IPRATROPIUM BROMIDE AND ALBUTEROL SULFATE 2.5; .5 MG/3ML; MG/3ML
1 SOLUTION RESPIRATORY (INHALATION) EVERY 4 HOURS PRN
Status: DISCONTINUED | OUTPATIENT
Start: 2024-06-25 | End: 2024-06-26 | Stop reason: HOSPADM

## 2024-06-25 RX ORDER — SUCCINYLCHOLINE CHLORIDE 20 MG/ML
20 INJECTION INTRAMUSCULAR; INTRAVENOUS ONCE
Status: DISCONTINUED | OUTPATIENT
Start: 2024-06-25 | End: 2024-06-25

## 2024-06-25 RX ORDER — SODIUM CHLORIDE 9 MG/ML
INJECTION, SOLUTION INTRAVENOUS PRN
Status: DISCONTINUED | OUTPATIENT
Start: 2024-06-25 | End: 2024-06-26 | Stop reason: HOSPADM

## 2024-06-25 RX ORDER — IPRATROPIUM BROMIDE AND ALBUTEROL SULFATE 2.5; .5 MG/3ML; MG/3ML
1 SOLUTION RESPIRATORY (INHALATION)
Status: DISCONTINUED | OUTPATIENT
Start: 2024-06-25 | End: 2024-06-25

## 2024-06-25 RX ORDER — SUCCINYLCHOLINE/SOD CL,ISO/PF 100 MG/5ML
SYRINGE (ML) INTRAVENOUS
Status: DISCONTINUED
Start: 2024-06-25 | End: 2024-06-25

## 2024-06-25 RX ORDER — ENZALUTAMIDE 40 MG/1
160 TABLET ORAL DAILY
COMMUNITY

## 2024-06-25 RX ORDER — SUCCINYLCHOLINE CHLORIDE 20 MG/ML
100 INJECTION INTRAMUSCULAR; INTRAVENOUS ONCE
Status: COMPLETED | OUTPATIENT
Start: 2024-06-25 | End: 2024-06-25

## 2024-06-25 RX ORDER — EPINEPHRINE IN SOD CHLOR,ISO 1 MG/10 ML
SYRINGE (ML) INTRAVENOUS
Status: COMPLETED | OUTPATIENT
Start: 2024-06-25 | End: 2024-06-25

## 2024-06-25 RX ADMIN — PIPERACILLIN AND TAZOBACTAM 4500 MG: 4; .5 INJECTION, POWDER, LYOPHILIZED, FOR SOLUTION INTRAVENOUS; PARENTERAL at 05:22

## 2024-06-25 RX ADMIN — ETOMIDATE 20 MG: 2 INJECTION, SOLUTION INTRAVENOUS at 20:07

## 2024-06-25 RX ADMIN — PIPERACILLIN AND TAZOBACTAM 4500 MG: 4; .5 INJECTION, POWDER, LYOPHILIZED, FOR SOLUTION INTRAVENOUS; PARENTERAL at 18:12

## 2024-06-25 RX ADMIN — POTASSIUM CHLORIDE 40 MEQ: 750 TABLET, EXTENDED RELEASE ORAL at 06:18

## 2024-06-25 RX ADMIN — ETOMIDATE 20 MG: 2 INJECTION INTRAVENOUS at 20:07

## 2024-06-25 RX ADMIN — PIPERACILLIN AND TAZOBACTAM 4500 MG: 4; .5 INJECTION, POWDER, LYOPHILIZED, FOR SOLUTION INTRAVENOUS; PARENTERAL at 12:44

## 2024-06-25 RX ADMIN — SODIUM BICARBONATE 50 MEQ: 84 INJECTION, SOLUTION INTRAVENOUS at 20:30

## 2024-06-25 RX ADMIN — SODIUM CHLORIDE, PRESERVATIVE FREE 10 ML: 5 INJECTION INTRAVENOUS at 09:32

## 2024-06-25 RX ADMIN — SUCCINYLCHOLINE CHLORIDE 100 MG: 20 INJECTION, SOLUTION INTRAMUSCULAR; INTRAVENOUS at 20:00

## 2024-06-25 RX ADMIN — SODIUM CHLORIDE: 9 INJECTION, SOLUTION INTRAVENOUS at 04:50

## 2024-06-25 RX ADMIN — ENOXAPARIN SODIUM 30 MG: 100 INJECTION SUBCUTANEOUS at 08:21

## 2024-06-25 RX ADMIN — SODIUM CHLORIDE: 9 INJECTION, SOLUTION INTRAVENOUS at 15:58

## 2024-06-25 RX ADMIN — PROPOFOL 20 MCG/KG/MIN: 10 INJECTION, EMULSION INTRAVENOUS at 20:56

## 2024-06-25 RX ADMIN — POTASSIUM CHLORIDE 40 MEQ: 750 TABLET, EXTENDED RELEASE ORAL at 09:32

## 2024-06-25 RX ADMIN — EPINEPHRINE 1 MG: 0.1 INJECTION, SOLUTION ENDOTRACHEAL; INTRACARDIAC; INTRAVENOUS at 20:30

## 2024-06-25 RX ADMIN — ONDANSETRON 4 MG: 2 INJECTION INTRAMUSCULAR; INTRAVENOUS at 08:22

## 2024-06-25 ASSESSMENT — PAIN SCALES - GENERAL
PAINLEVEL_OUTOF10: 0
PAINLEVEL_OUTOF10: 5

## 2024-06-25 ASSESSMENT — PAIN DESCRIPTION - LOCATION: LOCATION: UMBILICUS

## 2024-06-25 ASSESSMENT — PULMONARY FUNCTION TESTS: PIF_VALUE: 19

## 2024-06-25 NOTE — PROGRESS NOTES
Hospitalist Progress Note             Date of Service:  2024  NAME:  George Wong  :  1960  MRN:  [id number]    HPI pt feels great, off pressors, na levels up to 126, no growth in cultures though I think we need to treat empirically for infectious process, no cp,no fevers, no chills, no sob at rest     Assessment & Plan:      Severe sepsis (HCC)  -Still unclear etiology at this point in time no source has been found likely renal function and lactic acid has improved nicely after IV fluids continue broad-spectrum antibiotics for the time being while we watch cultures  -Remains on Levophed drip this morning through a central line to his groin would like to have this line out as soon as possible as he does have 2 nice IVs in his arms will give a dose of albumin due to his poor nutritional state and hypotension requiring pressors we will also start 3 doses of stress dose steroids  -SIRS criteria met with tachycardia and tachypnea  -systolic BP on arrival was 50's, patient did receive a 2 L bolus, which BP improved, but after bolus was done BP staarting to decrease and patient was placed on Levophed gtt  -Lactic: Present on admission 7.6, will trend-has improved to normal  -pracalcitonin: Quite elevated at 60  -PRN O2 to keep saturation greater than 92%  -2 L IV fluids in the ED, IVF continued for now  -Broad Spectrum IV antibiotics started in the ED and will continue Zosyn and Vancomyin will narrow when appropriate  -keep MAP > than 65, currently on Levophed Gtt  -blood cultures pending  -UA cloudy with 3+ bacteria no white blood cells no nitrites and no leukocyte Estrace  -CXR: No acute pulmonary process. Known multiple osseous metastases  -CT of ABD:  Fluid in the colon is compatible with diarrhea. No bowel obstruction. No abscess. Decreased small bilateral pleural effusions.-Patient has a rectal tube in but has no

## 2024-06-25 NOTE — PROGRESS NOTES
Admission Medication Reconciliation:    Information obtained from:  patient's mother/ pharmacy fill hx    Comments/Recommendations: Reviewed PTA medications and patient's allergies.    Spoke with mother, patient is changing from bicalutamide to Xtandi, but has not done so yet, changed list to reflect this        Allergies:  Bee venom    Significant PMH/Disease States:   Past Medical History:   Diagnosis Date    Bone cancer (HCC)     Diabetes (HCC)     Elevated PSA     Gout     Gout     High cholesterol     Hyperlipemia     Hypertension     prostate cancer     Pulmonary disease      Chief Complaint for this Admission:    Chief Complaint   Patient presents with    Loss of Consciousness     Prior to Admission Medications:   Prior to Admission medications    Medication Sig Start Date End Date Taking? Authorizing Provider   Enzalutamide (XTANDI) 40 MG TABS Take 160 mg by mouth daily Patient has not started yet, bicalutamide changing to this (6/25/24)   Yes Qian Hidalgo MD   ferrous sulfate (IRON 325) 325 (65 Fe) MG tablet Take 1 tablet by mouth daily (with breakfast)   Yes Qian Hidalgo MD   allopurinol (ZYLOPRIM) 100 MG tablet Take 2 tablets by mouth once daily 6/17/24   Tanner Eaton, APRN - NP   furosemide (LASIX) 20 MG tablet TAKE 1 TABLET BY MOUTH ONCE DAILY AS NEEDED FOR  SWELLING  IN  THE  LEGS  AND  FEET 6/9/24   Tanner Eaton, APRN - NP   leuprolide (LUPRON DEPOT, 6-MONTH,) 45 MG injection Inject 45 mg into the muscle every 6 months 4/17/24   Suzy Gibson, APRN - NP   losartan (COZAAR) 100 MG tablet Take 1 tablet by mouth daily    ProviderQian MD   bicalutamide (CASODEX) 50 MG chemo tablet Take 1 tablet by mouth daily 10/17/23 6/25/24  Zoran Yates MD Jacob Buck, [facility]

## 2024-06-25 NOTE — CONSENT
Informed Consent for Blood Component Transfusion Note    I have discussed with the mother (Caty Diggs) the rationale for blood component transfusion; its benefits in treating or preventing fatigue, organ damage, or death; and its risk which includes mild transfusion reactions, rare risk of blood borne infection, or more serious but rare reactions. I have discussed the alternatives to transfusion, including the risk and consequences of not receiving transfusion. The mother (Caty Diggs) had an opportunity to ask questions and had agreed to proceed with transfusion of blood components.    Electronically signed by BRYAN Hill CNP on 6/25/24 at 6:18 PM EDT

## 2024-06-25 NOTE — PROGRESS NOTES
Bedside shift change report given to SIOBHAN Diggs RN (oncoming nurse) by SIOBHAN Diggs RN (offgoing nurse). Report included the following information SBAR, Intake/Output, MAR, Recent Results, Med Rec Status, and Quality Measures.    1930  Shift assessment completed. Pt A&Ox3 and denies pain or needs at this time. Lungs with expiratory wheezing, but pt encouraged to cough and wheezes cleared. BS present.  , foot pushes strong.  ST on telemetry-. All other VSS. CVL to R femoral. Good blood return in all lumens. NS infusing at 25ml/hr as ordered. Yankauer within reach for sputum. Pt denies nausea. CBWR.     2100  Medications administered per order.     2345  Pt vomited 200ml of tan, cloudy fluid. Pt stated \"it just comes up\". I advised pt it wasn't time for additional nausea medication but will re-assess when it is due. Pt agreed.     0100  Pt lying in bed with eyes closed. No sign of distress or discomfort. CBWR.     0400  Pt awake and asking for a ginger ale and it was given.      0645  Bedside shift change report given to AYE Anderson RN (oncoming nurse) by SIOBHAN Diggs RN (offgoing nurse). Report included the following information SBAR, Intake/Output, MAR, Recent Results, Med Rec Status, and Quality Measures.

## 2024-06-25 NOTE — PLAN OF CARE
Problem: Discharge Planning  Goal: Discharge to home or other facility with appropriate resources  Outcome: Progressing     Problem: Safety - Adult  Goal: Free from fall injury  Outcome: Progressing     Problem: Skin/Tissue Integrity  Goal: Absence of new skin breakdown  Description: 1.  Monitor for areas of redness and/or skin breakdown  2.  Assess vascular access sites hourly  3.  Every 4-6 hours minimum:  Change oxygen saturation probe site  4.  Every 4-6 hours:  If on nasal continuous positive airway pressure, respiratory therapy assess nares and determine need for appliance change or resting period.  Outcome: Progressing     Problem: ABCDS Injury Assessment  Goal: Absence of physical injury  Outcome: Progressing     Problem: Chronic Conditions and Co-morbidities  Goal: Patient's chronic conditions and co-morbidity symptoms are monitored and maintained or improved  Outcome: Progressing     Problem: Pain  Goal: Verbalizes/displays adequate comfort level or baseline comfort level  Outcome: Progressing     Problem: Neurosensory - Adult  Goal: Achieves stable or improved neurological status  Outcome: Progressing     Problem: Respiratory - Adult  Goal: Achieves optimal ventilation and oxygenation  Outcome: Progressing     Problem: Cardiovascular - Adult  Goal: Maintains optimal cardiac output and hemodynamic stability  Outcome: Progressing     Problem: Skin/Tissue Integrity - Adult  Goal: Skin integrity remains intact  Outcome: Progressing     Problem: Musculoskeletal - Adult  Goal: Return mobility to safest level of function  Outcome: Progressing  Goal: Maintain proper alignment of affected body part  Outcome: Progressing     Problem: Gastrointestinal - Adult  Goal: Minimal or absence of nausea and vomiting  Outcome: Progressing     Problem: Genitourinary - Adult  Goal: Absence of urinary retention  Outcome: Progressing     Problem: Infection - Adult  Goal: Absence of infection at discharge  Outcome: Progressing      Problem: Metabolic/Fluid and Electrolytes - Adult  Goal: Electrolytes maintained within normal limits  Outcome: Progressing     Problem: Hematologic - Adult  Goal: Maintains hematologic stability  Outcome: Progressing     Problem: Spiritual Care  Goal: Pt/Family able to move forward in process of forgiving self, others, and/or higher power  Description: INTERVENTIONS:  1. Assist patient/family to overcome blocks to healing by use of spiritual practices (prayer, meditation, guided imagery, reiki, breath work, etc).  2. De-myth guilt and help patient/family identify possible irrational spiritual/cultural beliefs and values.  3. Explore possibilities of making amends & reconciliation with self, others, and/or a greater power.  4. Guide patient/family in identifying painful feelings of guilt.  5. Help patient/famiy explore and identify spiritual beliefs, cultural understandings or values that may help or hinder letting go of issue.  6. Help patient/family explore feelings of anger, bitterness, resentment.  7. Help patient/family identify and examine the situation in which these feelings are experienced.  8. Help patient/family identify destructive displacement of feelings onto other individuals.  9. Invite use of sacraments/rituals/ceremonies as appropriate (e.g. - confession, anointing, smudging).  10. Refer patient/family to formal counseling and/or to [place] community for further support work.  Outcome: Progressing

## 2024-06-25 NOTE — CONSULTS
CARDIOLOGY CONSULTATION    REASON FOR CONSULT: Congestive heart failure with reduced EF    REQUESTING PROVIDER: SACHI Rendon    CHIEF COMPLAINT: LOC    HISTORY OF PRESENT ILLNESS:  George Wong is a 64 y.o. year-old male with past medical history significant for metastatic prostate cancer, hypertension, diabetes mellitus diet-controlled, intellectual disability, and gout who was evaluated today due to new findings of congestive heart failure with reduced EF.  Patient admitted over the weekend after being found unresponsive at home by his mother while taking a bath.  Per EHR mother reports a decline in health ever since diagnosis of cancer in October 2023.  On arrival to the ED patient was severely hypotensive with SBP< 50, tachycardic and tachypneic.  Patient was admitted and sepsis protocol initiated.  At bedside this morning patient denies any shortness of breath, chest pain or palpitations.  Endorses ongoing fatigue, weakness and poor oral intake.    Records from hospital admission course thus far reviewed.      Telemetry reviewed.  No acute events overnight. Sinus tachycardia.    INPATIENT MEDICATIONS:  Home medications reviewed.    Current Facility-Administered Medications:     piperacillin-tazobactam (ZOSYN) 4,500 mg in sodium chloride 0.9 % 100 mL IVPB (mini-bag), 4,500 mg, IntraVENous, Q6H, Naresh Ramirez MD    ipratropium 0.5 mg-albuterol 2.5 mg (DUONEB) nebulizer solution 1 Dose, 1 Dose, Inhalation, Q4H PRN, Nette Lowry APRN - CNP    enoxaparin Sodium (LOVENOX) injection 30 mg, 30 mg, SubCUTAneous, BID, Naresh Ramirez MD, 30 mg at 06/25/24 0821    vancomycin (VANCOCIN) 1,250 mg in sodium chloride 0.9 % 250 mL IVPB ([id number]), 1,250 mg, IntraVENous, Q12H, Naresh Ramirez MD, Last Rate: 166.7 mL/hr at 06/25/24 0930, Associate Infusion Device at 06/25/24 0930    Vancomycin level to be drawn 6/25 at 0930 before 1000 dose, , Other, RX Placeholder, Naresh Ramirez MD     prochlorperazine (COMPAZINE) injection 10 mg, 10 mg, IntraVENous, Q6H PRN, Caroline Levi APRN - CNP, 10 mg at 06/24/24 1900    norepinephrine (LEVOPHED) 16 mg in sodium chloride 0.9 % 250 mL infusion, 1-100 mcg/min, IntraVENous, Continuous, Lizeth Desir MD, Stopped at 06/24/24 1121    0.9 % sodium chloride infusion, , IntraVENous, Continuous, Naresh Ramirez MD, Last Rate: 75 mL/hr at 06/25/24 0800, Associate Infusion Device at 06/25/24 0800    Vancomycin - Pharmacy to dose, , Other, RX Placeholder, Lizeth Desir MD    sodium chloride flush 0.9 % injection 5-40 mL, 5-40 mL, IntraVENous, 2 times per day, Caroline Levi APRN - CNP, 10 mL at 06/25/24 0932    sodium chloride flush 0.9 % injection 5-40 mL, 5-40 mL, IntraVENous, PRN, Caroline Levi APRN - CNP    ondansetron (ZOFRAN-ODT) disintegrating tablet 4 mg, 4 mg, Oral, Q8H PRN **OR** ondansetron (ZOFRAN) injection 4 mg, 4 mg, IntraVENous, Q6H PRN, Caroline Levi APRN - CNP, 4 mg at 06/25/24 0822    polyethylene glycol (GLYCOLAX) packet 17 g, 17 g, Oral, Daily PRN, Caroline Levi APRN - CNP    acetaminophen (TYLENOL) tablet 650 mg, 650 mg, Oral, Q6H PRN, 650 mg at 06/24/24 0502 **OR** acetaminophen (TYLENOL) suppository 650 mg, 650 mg, Rectal, Q6H PRN, Caroline Levi APRN - CNP    loperamide (IMODIUM) capsule 2 mg, 2 mg, Oral, 4x Daily PRN, Caroline Levi APRN - CNP    traMADol (ULTRAM) tablet 50 mg, 50 mg, Oral, Q6H PRN, Caroline Levi APRN - CNP, 50 mg at 06/24/24 1201     ALLERGIES:  Allergies reviewed with the patient,  Allergies   Allergen Reactions    Bee Venom Swelling    .      FAMILY HISTORY:  Family history reviewed.     SOCIAL HISTORY:  Notable for former tobacco use, no heavy alcohol or illicit drug use.      REVIEW OF SYSTEMS:  Complete review of systems performed, pertinents noted above, all other systems are negative.    PHYSICAL EXAMINATION:    General:  Alet with confusion. Poor  historian. Frail appearing.   Cardiovascular:  rate slightly elevated and rhythm regular, normal S1-S2, No murmur, rubs or cheryl's. No JVD.   Respiratory:  Lungs with inspiratory wheezing. On RA.   Abdomen:  Soft, non tender. + BS  Extremities:  No lower extremity edema. Generalized weakness.   Skin:  Dry, warm  Psych: Flat affect.       Vitals:    06/25/24 1000   BP: 121/73   Pulse: (!) 107   Resp: 25   Temp:    SpO2:        Recent labs results and imaging reviewed.     Discussed case with Dr. Heller and our impression and recommendations are as follows:    Congestive heart failure: Mild orthopnea. No peripheral edema.   Echo with new findings of reduced EF 40-45%, unable to assess wall motion, grade I DD with normal LAP.   pBNP > 18 K  CXR shows no acute pulmonary process  GDMT limited at this time given hypotension   Currently on palliative oral chemotherapy for metastatic prostate CA.   Agreeable to follow [facility] for HF management.   Continue Strict I& O and daily weights  Monitor for volume overload    Pericardial effusion  Trivial to small pericardial effusion present. Pericardial effusion contains echogenic structures. No indication of cardiac tamponade.   Continue to monitor     Sepsis: Severely hypotensive on arrival with SBP 50's.   S/p IVF per sepsis protocol   Unclear etiology-cultures pending  Managed by primary-started on ABX and stress steroids   Lactic on admission 7.6, Procal 60  Remains on Levophed drip for hypotension    Hypotension: Improving, levophed being weaned.   Continue to treat underline cause   Continue levophed gtt, keep MAP > 65  Caution with volume replacement given reduce EF  HGB 6.7 today, consider transfuse given hypotension requiring pressor support     Tachycardia: -110  Continue to treat underline cause   Wean levophed as above   Consider PRBC transfusion as above    HILDA: Likely secondary to dehydration  Crt. 1.81 on admission, now 0.91  Gentle IVF     Hyponatremia: Suspect

## 2024-06-25 NOTE — PROGRESS NOTES
Patient with moderate blood loss from the site where his cental line was removed, STAT HH 6.2/20, orders placed to transfuse 1 unit of PRBC, consent received from the patient mother Caty Diggs.

## 2024-06-25 NOTE — PROGRESS NOTES
conducted a Follow up consultation and Spiritual Assessment for George Wong, who is a 64 y.o.,male.      The  provided the following Interventions:  Continued the relationship of care and support.   Listened empathically.  Offered assurance of continued prayer on patients behalf.   Chart reviewed.    The following outcomes were achieved:  Patient expressed gratitude for 's visit.    Assessment:  There are no further spiritual or Mandaen issues which require Spiritual Care Services interventions at this time.     Plan:  Chaplains will continue to follow and will provide pastoral care on an as needed/requested basis.   recommends bedside caregivers page  on duty if patient shows signs of acute spiritual or emotional distress.        Rita Parker   Spiritual Care   [phone]

## 2024-06-25 NOTE — THERAPY EVALUATION
PHYSICAL THERAPY EVALUATION    Patient: George Wong (64 y.o. male)  Date: 6/25/2024  Physical Therapy Time:   PT Individual Minutes  Time In: 0953  Time Out: 1035  Minutes: 42  Timed Minute Breakdown:  15 eval 27 T.A.     Primary Diagnosis: Syncope and collapse [R55]  Elevated brain natriuretic peptide (BNP) level [R79.89]  Severe sepsis (HCC) [A41.9, R65.20]  Prostate cancer metastatic to bone (HCC) [C61, C79.51]  Diarrhea, unspecified type [R19.7]  Sepsis with acute renal failure and septic shock, due to unspecified organism, unspecified acute renal failure type (HCC) [A41.9, R65.21, N17.9] Severe sepsis (HCC)  Present on Admission:   Severe sepsis (HCC)   Hyponatremia   Anemia, unspecified   Malignant neoplasm of prostate metastatic to lymph nodes of multiple sites (HCC)   HILDA (acute kidney injury) (HCC)   Essential hypertension        Precautions:   Restrictions/Precautions  Restrictions/Precautions: Other (comment) (FMS, nausea with vomiting, HR elevated)      Assessment: Pt admitted after being found unresponsive with findings of sepsis with an unknown source at this time. He also has a hx of prostate CA with mets. He performs mobility well with some assistance needed at times. He requires increased time to complete tasks and for set up due to multiple lines. Upon completion he is left in recliner with all needs met.  Performance Deficits/Impairments: Decreased functional mobility ;Decreased ADL status;Decreased strength;Decreased cognition;Decreased endurance  Treatment Diagnosis: difficulty in walking  Therapy Prognosis: Good  Decision Making: Medium Complexity  Discharge Recommendations: Home with Home health PT    Conditions Requiring skilled therapeutic intervention:  Performance Deficits/Impairments: Decreased functional mobility ;Decreased ADL status;Decreased strength;Decreased cognition;Decreased endurance     Evaluation Activity Tolerance:   Activity Tolerance  Activity Tolerance: Patient  MOD (I).  Short Term Goal 2: Pt will perform all transfers with MOD (I).  Short Term Goal 3: Pt will ambulate 200' with a RW with MOD (I).  Short Term Goal 4: Pt will navigate 4 steps with rails with MOD (I).     Thank you for this referral.  Therapist Signature: James Celestin, PT

## 2024-06-25 NOTE — PROGRESS NOTES
Physical Therapy  Facility/Department: [facility] 2 ICU  Daily Treatment Note  NAME: George Wong  : 1960  MRN: [id number]    Date of Service: 2024    Discharge Recommendations:  (P) Home with Home health PT   PT Equipment Recommendations  Equipment Needed: (P) Yes  Walker: (P) Rolling    Patient Diagnosis(es): The primary encounter diagnosis was Sepsis with acute renal failure and septic shock, due to unspecified organism, unspecified acute renal failure type (HCC). Diagnoses of Syncope and collapse, Diarrhea, unspecified type, Prostate cancer metastatic to bone (HCC), and Elevated brain natriuretic peptide (BNP) level were also pertinent to this visit.    Assessment   Activity Tolerance: (P) Patient tolerated treatment well  Equipment Needed: (P) Yes  Mobility Devices: Walker   Upon entry Pt is eager to return to bed to rest. Pt attempted to stand 2x with poor technique. Cues pt to get feet flat on the floor and under him while pushing up to stand. With cues Pt was able to stand min A from low chair. Pt lays down with little difficulty and is min A to reposition self in bed. Pt is left in bed with all needs met call light in reach.   Plan    Physical Therapy Plan  General Plan: (P) Continue with current plan     Restrictions  Restrictions/Precautions  Restrictions/Precautions: Other (comment) (FMS, nausea with vomiting, HR elevated)     Subjective    Subjective  Subjective: (P) I need to get back to bed to rest. Pt reported getting weak from sitting in chair  Pain: n/a  Orientation  Overall Orientation Status: Within Functional Limits  Cognition  Overall Cognitive Status: WFL     Objective   Vitals     Bed Mobility Training  Bed Mobility Training: (P) Yes  Sit to Supine: (P) Stand-by assistance  Scooting: (P) Stand-by assistance  Balance  Sitting: (P) Intact  Standing: (P) Intact  Transfer Training  Transfer Training: (P) Yes  Overall Level of Assistance: (P) Minimum assistance  Sit to Stand: (P)  Contact-guard assistance  Stand to Sit: (P) Minimum assistance  Stand Pivot Transfers: (P) Minimum assistance     PT Exercises  Exercise Treatment: (P) declined. request to rest after sitting up in chair             Goals  Short Term Goals  Time Frame for Short Term Goals: 7 days  Short Term Goal 1: Pt will perform sup <> sit with MOD (I).  Short Term Goal 2: Pt will perform all transfers with MOD (I).  Short Term Goal 3: Pt will ambulate 200' with a RW with MOD (I).  Short Term Goal 4: Pt will navigate 4 steps with rails with MOD (I).  Patient Goals   Patient Goals : to feel better    Education  Patient Education  Education Given To: (P) Patient  Education Provided: (P) Role of Therapy;Plan of Care;Precautions;Transfer Training;Equipment;Fall Prevention Strategies  Education Method: (P) Verbal  Barriers to Learning: (P) None  Education Outcome: (P) Verbalized understanding      Therapy Time   Individual Concurrent Group Co-treatment   Time In (P) 1419         Time Out (P) 1431         Minutes (P) 12                 Lisa Young, PTA

## 2024-06-25 NOTE — PLAN OF CARE
Problem: Skin/Tissue Integrity  Goal: Absence of new skin breakdown  Description: 1.  Monitor for areas of redness and/or skin breakdown  2.  Assess vascular access sites hourly  3.  Every 4-6 hours minimum:  Change oxygen saturation probe site  4.  Every 4-6 hours:  If on nasal continuous positive airway pressure, respiratory therapy assess nares and determine need for appliance change or resting period.  6/25/2024 0022 by Alexandria Diggs, RN  Outcome: Progressing     Problem: ABCDS Injury Assessment  Goal: Absence of physical injury  6/25/2024 1008 by Rogers Anderson, RN  Outcome: Progressing  6/25/2024 0022 by Alexandria Diggs, RN  Outcome: Progressing

## 2024-06-25 NOTE — FLOWSHEET NOTE
06/25/24 0705   Vitals   Temp 98.2 °F (36.8 °C)   Temp Source Oral   Pulse (!) 104   Heart Rate Source Monitor   Respirations (!) 32   /70   MAP (Calculated) 90   BP Location Left upper arm   BP Method Automatic   Patient Position Semi fowlers   Cardiac Rhythm Sinus tachy   Pain Assessment   Pain Assessment 0-10   Pain Level 5   Patient's Stated Pain Goal 0 - No pain   Pain Location Umbilicus   Oxygen Therapy   SpO2 98 %   Pulse Oximetry Type Continuous   O2 Device None (Room air)

## 2024-06-25 NOTE — PROGRESS NOTES
Physician Progress Note      PATIENT:               CATHY GARCIA  CSN #:                  [id number]  :                       1960  ADMIT DATE:       2024 12:04 PM  DISCH DATE:  RESPONDING  PROVIDER #:        Naresh Saavedra MD          QUERY TEXT:    Pt admitted with severe sepsis, HILDA, hyponatremia. Pt noted to have   hypotension requiring IV vasopressor. If possible, please document in the   progress notes and discharge summary if you are evaluating and/or treating any   of the following:    The medical record reflects the following:  Risk Factors:  -per ED MD note documented 24, \"64 y.o. male who presents to the ED via   EMS called at scene for patient being unresponsive...declining health over the   last few months due to metastatic pancreatic cancer...he appeared very weak   but was able to ambulate with walker to bathroom.  He had large diarrheal   stool and when mother was attempting to clean patient up she reports he passed   out prompting her to call EMS\".    Clinical Indicators:  -per H&P documented 24, \" In the ED sepsis alert was called patient did   receive 2 L of normal saline, was started on Zosyn and vancomycin, and for   blood pressure to support Levophed drip was started...systolic BP on arrival   was 50's, patient did receive a 2 L bolus, which BP improved, but after bolus   was done BP starting to decrease and patient was placed on Levophed   gtt...Lactic: 7.6\"    -per Attending progress note documented 24, \"Remains on Levophed drip   this morning...will give a dose of albumin due to his poor nutritional state   and hypotension requiring pressors we will also start 3 doses of stress dose   steroids...Hyponatremia-sodium remains low this morning suspect this is   hypovolemic in nature continue IV fluids with normal saline avoid hypotonic   fluids no fluid restriction at this time due to dehydration trend every 6   hours\"    -Vital Signs:  24 1208: ,

## 2024-06-25 NOTE — PROGRESS NOTES
George Wong    0645  Bedside shift change report received by SIOBHAN Diggs RN (offgoing nurse). Report included the following information SBAR, Intake/Output, MAR, Recent Results, Med Rec Status, and Quality Measures.     0703-Patient assessment completed: view flowsheet    Patient Concerns at this time: discharge plans  Plan of care reviewed & Ongoing  Patient Education Provided  White board updated and completed.    0730-Breakfast tray given to patient; patient assisted with setup and fed breakfast    0821-Scheduled morning medication administered;PRN zofran given    0900-patient resting with eyes closed, VSS, CBWR    0950-PT/OT at bedside    Patient ambulating around unit with PT using walker- patient tolerating well      1200-patient OOB sitting in chair, family at bedside    1400-patient assisted back to bed, no distress, CBWR    CHG bath given    Central line removed; quick clot applied x 3 with >15min x 4 session; bleeding ended ; pressure dressing applied    1820-patient resting, patient denies pain or distress, no distress noted        PRN medication given: 4mg IV Zofran  Events during shift: central line removal  New orders received during shift: PRBC  Patient end of shift concerns: none

## 2024-06-26 LAB
ABO + RH BLD: NORMAL
BLD PROD TYP BPU: NORMAL
BLOOD BANK DISPENSE STATUS: NORMAL
BLOOD GROUP ANTIBODIES SERPL: NEGATIVE
BPU ID: NORMAL
CROSSMATCH RESULT: NORMAL
SPECIMEN EXP DATE BLD: NORMAL
TRANSFUSION STATUS PATIENT QL: NORMAL
UNIT DIVISION: 0

## 2024-06-26 NOTE — PROGRESS NOTES
-185 Received care of pt from off going nurse.     -1948 This nurse in with pt. Pt having agonal resp.    -  MRT called.    - [facility] called. Dr Munoz into intubate pt.  100 mg Succ IV given.  20 mg Etomidate IV given.    - 7.5 ETT inserted, 26 @ lip.  - 18 FR OGT inserted.  - Pt in PEA.     - 1 mg IV epi given.  1 amp Bicarb given. Pt mother in with pt.  Pt is made a DNR.  - Pt has pulse.  - Pt biting against ETT and reaching for ETT.  Diprivan drip started     -  Pt asystole on cardiac monitor.  No pulse noted.  AYE Levi NP into pronounce pt.       - [employer] notified, spoke to representative Stu.  Per representative ptis suitable for eye donation. [facility]  will call back.    -- [facility]  calling for information on pt.  Per representative will call pt mother.    - [facility] calling back okay to release pt body to  home.    -214 Post mortem care done    -2150 Pt family members at pt bedside.    -225 Pt mother has asked for  [facility] to be called.     - [facility] called.    -235 Representatives from [facility] into collect pt body.

## 2024-06-26 NOTE — PROCEDURES
PROCEDURE NOTE  Date: 6/25/2024   Name: George Wong  YOB: 1960    Endotracheal Intubation Procedure Note    INDICATION: Respiratory Distress/Failure    CONSENT:   [_] During the informed consent discussion regarding the procedure, or treatment, I explained the following to the patient/designee:    a. Nature of the procedure or treatment and who will perform the procedure or treatment.    b. Necessity for procedure and the possible benefits.    c. Risks and complications (most common and serious).    d. Alternative treatments and the risks, benefits and side effects of each (including no treatment).    e. Likelihood of the patient achieving his/her goals without this procedure and surgery treatment.    f. Problems that might occur during the recuperation.    g. Conflicts of interest, if any      [x_] The procedure was emergent, the patient was unable to provide consent, and a designee was not immediately available.    PROCEDURE SUMMARY:     Called to bedside by PM Hospitalist NATE Levi for procedure. RT Narendra also at bedside. A time out was performed. My hands were washed immediately prior to the  procedure. PPE wore throughout procedure. The patient was placed on a cardiac  monitor including continuous pulse oximetry. Rapid Sequence Intubation  was conducted. The patient received 20 mg of etomidate for induction and 100 mg of succinylcholine for adequate paralysis. Cricoid pressure was maintained from time  induction agent was given to time of cuff balloon inflation. Using a 4 Mac laryngoscope and a size 7.5 endotracheal tube with stylet, the patient was intubated on the first attempt. The stylet was removed and cuff balloon was  inflated. Appropriate endotracheal tube position was confirmed by  direct visualization of vocal cord passage, fogging of the tube, CO2  colormetric indicator and symmetric breath sounds. The tube was secured  at26 cm at the lips. Post intubation chest x-ray is pending

## 2024-06-26 NOTE — PROGRESS NOTES
Received phone call from patient primary nurse, the patient is currently not responded at this time, patient noted to be tachycardic and tachypneic, oxygen saturation dropped to the 50s, MRT called at this time, call placed to ED provider Dr. Munoz to intubate the patient.  Patient intubated and tolerated well    2030 patient PEA, cold called, patient received 1 round of epi, and a round of sodium bicarb, ROSC at 2035.  Patient's mother at the bedside at this time, she has decided to make the patient a DNR, she would like to continue with care, lab did call with a critical alert for hemoglobin of 4.8, patient already has order to get 1 unit of blood, currently will waiting for labs call with blood being ready.  Patient currently trying to pull out ET tube, orders placed for bilateral wrist restraints, patient be started on a propofol drip.

## 2024-06-26 NOTE — DISCHARGE SUMMARY
Death Discharge Summary    Arrived at bedside, after nursing called to state patient HR is decreased from 80's to 30's arrived at the bedside, patient is currently PEA. No spontaneous movement was present. There was no verbal or tactile response.  No carotid pulses was palpable and there was no heart sounds auscultated over the precordium.  No breath sounds appreciated in any lung fields.     Patient pronounced  on 2024 @ 2115.

## 2024-06-26 NOTE — PROGRESS NOTES
MRT called. Upon arrival to bedside, pt noted to be agonal breathing. Pt assisted with ventilation and oxygenated via Ambu at 100%. Dr. Munoz arrived at bedside and proceeded with intubation using 7.5 ETT without complication. Positive color change displayed via CO2 detector, BBS heard upon auscultation. Pt placed on mechanical ventilation( see flowsheet ). Pt went into PEA and CODE BLUE started with achievement of ROSC. Pt placed back on mechanical ventilation at previous settings. Will continue to monitor. ETT lot number [id number]

## 2024-06-29 LAB
BACTERIA SPEC CULT: NORMAL
BACTERIA SPEC CULT: NORMAL
Lab: NORMAL
Lab: NORMAL

## 2024-07-10 NOTE — PROGRESS NOTES
Physician Progress Note      PATIENT:               CATHY GARCIA  CSN #:                  [id number]  :                       1960  ADMIT DATE:       2024 12:04 PM  DISCH DATE:        2024 11:59 PM  RESPONDING  PROVIDER #:        Caroline Levi ACNP          QUERY TEXT:    Pt admitted with sepsis with septic shock, HILDA< dehydration, hyponatremia and   has anemia documented. If possible, please document in progress notes and   discharge summary further specificity regarding the acuity and type of anemia:    The medical record reflects the following:  Risk Factors:  -per H&P documented 24, \" 64 y.o. -American male with a past   medical history of metastatic prostate cancer mets to bone, hypertension,   diabetes mellitus (diet controlled), intellectual disability, and gout,   patient presents to the ED after being found unresponsive by his mother\".    Clinical Indicators:  -Labs as documented in Epic:  24 1213: Hgb 7.5, platelets 171  24 0334: Hgb 7.2, platelets 142  24 0550: Hgb 6.7, platelets 116  24 1720: Hgb 6.2  24 2015: Hgb 4.8, platelets 104    -per Attending progress note documented 24, \"Patient with moderate   blood loss from the site where his cental line was removed, STAT HH .,   orders placed to transfuse 1 unit of PRBC\"    Treatment:  -labs, PRBCs    Thank you,  Elizabeth Tijerina, SHERRIEN, RN, CCDS, CRCR  Clinical   [email or web address] or contact via [employer]  Options provided:  -- Anemia due to acute blood loss  -- Anemia due to chronic blood loss  -- Anemia due to acute on chronic blood loss  -- Anemia due to iron deficiency  -- Anemia due to metastatic prostate cancer  -- Anemia due to antineoplastic chemotherapy  -- Other - I will add my own diagnosis  -- Disagree - Not applicable / Not valid  -- Disagree - Clinically unable to determine / Unknown  -- Refer to Clinical Documentation  Clinical Documentation Reviewer    PROVIDER RESPONSE TEXT:    This patient has chronic systolic CHF/HFrEF.    Query created by: Elizabeth Tijerina on 7/9/2024 10:31 AM      QUERY TEXT:    Pt admitted with sepsis with septic shock, HILDA, dehydration, hyponatremia and   has respiratory failure documented. If possible, please document in progress   notes and discharge summary further specificity regarding the type and acuity   of respiratory failure:    The medical record reflects the following:  Risk Factors:  -per H&P documented 6/23/24, \" 64 y.o. -American male with a past   medical history of metastatic prostate cancer mets to bone, hypertension,   diabetes mellitus (diet controlled), intellectual disability, and gout,   patient presents to the ED after being found unresponsive by his mother\".    Clinical Indicators:  -per Attending progress note documented 6/25/24 2020, \"Received phone call   from patient primary nurse, the patient is currently not responded at this   time, patient noted to be tachycardic and tachypneic, oxygen saturation   dropped to the 50s, MRT called at this time, call placed to ED provider Dr. Munoz to intubate the patient.  Patient intubated and tolerated well\".    -per RT progress note documented 6/25/24 2020, \"MRT called. Upon arrival to   bedside, pt noted to be agonal breathing. Pt assisted with ventilation and   oxygenated via Ambu at 100%. Dr. Munoz arrived at bedside and proceeded with   intubation using 7.5 ETT without complication. Positive color change displayed   via CO2 detector, BBS heard upon auscultation. Pt placed on mechanical   ventilation( see flowsheet ). Pt went into PEA and CODE BLUE started with   achievement of ROSC. Pt placed back on mechanical ventilation at previous   settings\".    -per ED MD note documented 6/25/24 2019 (Alexander), \"Respiratory   Distress/Failure\"    Treatment:  -labs, imaging, intubation, mechanical ventilation    Thank you,  Elizabeth

## (undated) PROCEDURE — 3E033XZ INTRODUCTION OF VASOPRESSOR INTO PERIPHERAL VEIN, PERCUTANEOUS APPROACH: ICD-10-PCS

## (undated) PROCEDURE — 02HV33Z INSERTION OF INFUSION DEVICE INTO SUPERIOR VENA CAVA, PERCUTANEOUS APPROACH: ICD-10-PCS

## (undated) PROCEDURE — 0BH17EZ INSERTION OF ENDOTRACHEAL AIRWAY INTO TRACHEA, VIA NATURAL OR ARTIFICIAL OPENING: ICD-10-PCS

## (undated) PROCEDURE — 5A1935Z RESPIRATORY VENTILATION, LESS THAN 24 CONSECUTIVE HOURS: ICD-10-PCS

## (undated) DEVICE — REM POLYHESIVE ADULT PATIENT RETURN ELECTRODE: Brand: VALLEYLAB

## (undated) DEVICE — SINGLE-USE POLYPECTOMY SNARE: Brand: CAPTIFLEX

## (undated) DEVICE — 1200CC GUARDIAN II: Brand: GUARDIAN

## (undated) DEVICE — WASH CLOTH INCONT LO LINT PREM 12X13 IN LF DISP

## (undated) DEVICE — SPONGE GZ W4XL4IN COT 12 PLY TYP VII WVN C FLD DSGN

## (undated) DEVICE — SOL IRR STRL H2O 1000ML BTL --

## (undated) DEVICE — JELLY,LUBE,STERILE,FLIP TOP,TUBE,4-OZ: Brand: MEDLINE

## (undated) DEVICE — PAD,PREPPING,CUFFED,24X48,7",NONSTERILE: Brand: MEDLINE

## (undated) DEVICE — STERILE POLYISOPRENE POWDER-FREE SURGICAL GLOVES: Brand: PROTEXIS

## (undated) DEVICE — TUBING, SUCTION, 9/32" X 10', STRAIGHT: Brand: MEDLINE